# Patient Record
Sex: FEMALE | Race: WHITE | Employment: OTHER | URBAN - METROPOLITAN AREA
[De-identification: names, ages, dates, MRNs, and addresses within clinical notes are randomized per-mention and may not be internally consistent; named-entity substitution may affect disease eponyms.]

---

## 2022-08-03 ENCOUNTER — HOSPITAL ENCOUNTER (OUTPATIENT)
Dept: PHYSICAL THERAPY | Age: 77
Setting detail: THERAPIES SERIES
Discharge: HOME | End: 2022-08-03
Payer: MEDICARE

## 2022-08-03 PROCEDURE — 97162 PT EVAL MOD COMPLEX 30 MIN: CPT
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
## 2022-08-18 ENCOUNTER — HOSPITAL ENCOUNTER (OUTPATIENT)
Dept: PHYSICAL THERAPY | Age: 77
Setting detail: THERAPIES SERIES
Discharge: HOME | End: 2022-08-18
Payer: MEDICARE

## 2022-08-18 PROCEDURE — 97112 NEUROMUSCULAR REEDUCATION: CPT

## 2022-08-18 PROCEDURE — 97110 THERAPEUTIC EXERCISES: CPT

## 2022-08-18 NOTE — PROGRESS NOTES
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]  Unspecified abnormalities of gait and mobility [R26.9]  Personal history of transient ischemic attack (TIA), and cerebral infarction without residual deficits [Z86.73]  Muscle wasting and atrophy, not elsewhere classified, unspecified thigh [M62.559]    Treatment Diagnosis: Impaired mobility, LE weakness    Visit Information:  Insurance: Payor: Radha Moise / Plan: Christiano Garland / Product Type: *No Product type* /   PT Visit Information  Total # of Visits Approved:  (Eval only)  Total # of Visits to Date: 1  No Show: 0  Progress Note Due Date: 22  Canceled Appointment: 0  Progress Note Counter:  (22 to 22)    Subjective Information:  Subjective: States last fall was , when she fell in yard and broke 3rd finger on Rt hand. Pt reports blood sugar 66 prior to leaving for therapist. Eating candy at current. Denies further need for sugar.   HEP Compliance:  [x] Good [] Fair [] Poor [] Reports not doing due to:    Pain Screening  Patient Currently in Pain: Denies    Treatment:  Exercises:  Exercises  Exercise 1: Static standing: FA x60 sec, FT x60 sec, semitandem 30 sec x 2 b/l  Exercise 3: Single stepping over str cane F/L x10 EA  Exercise 4: Gait drills: F/L/R/Marching x2 laps ea with 0-2 UE support in // bars  Exercise 5: BUE wall slides at number wall with \"4\" and \"5'' as targets 5 sec x 10  Exercise 6: 2 way SLR x10  Exercise 7: Bridges 5 sec x 10  Exercise 8: Clams x10  Exercise 20: HEP: bridge, 2 way SLR, clam     *Indicates exercise, modality, or manual techniques to be initiated when appropriate    Objective Measures:   Ambulation  Surface: carpet  Device: No Device  Assistance: Supervision, Independent  Quality of Gait: Step to gait pattern with decreased LT step length, FWD flexed posture with upper body lateral shift to Lt  Distance: clinical distances    Assessment: Body Structures, Functions, Activity Limitations Requiring Skilled Therapeutic Intervention: Decreased functional mobility , Decreased ROM, Decreased strength, Decreased endurance, Decreased balance, Decreased posture  Assessment: Initiated tx per PT POC with good tolerance. Focused on therex to improve core and LE strength and posture to improve tolerance to functional WB activities. VC with WB activities to decrease fwd flexed posture with fair carryover initially though unable to maintain. Treatment Diagnosis: Impaired mobility, LE weakness  Therapy Prognosis: Good         Post-Pain Assessment:       Pain Rating (0-10 pain scale):   0/10   Location and pain description same as pre-treatment unless indicated. Action: [] NA   [x] Perform HEP  [] Meds as prescribed  [] Modalities as prescribed   [] Call Physician     GOALS   Patient Goal(s):      Short Term Goals Completed by 3 weeks Goal Status   STG 1 Independent with HEP In progress   STG 2 Pt will be able to complete sit to stand transfers without UE support S/I. In progress       Long Term Goals Completed by 6 weeks Goal Status   LTG 1 Improve jake LE strength to >/= 4+/5 to improve stability with standing and walking. In progress   LTG 2 Pt will ambulate without an AD >/= 200' on even and uneven surfaces with improved foot clearance and stride length S/I. In progress   LTG 3 Masters >/= 45/56 to reduce pt's risk for falls. In progress   LTG 4 5xSTS from chair with decreased reliance on UEs </= 15 sec to improve pt's functional strength.  In progress            Plan:  Frequency/Duration:  Plan  Plan Frequency: 2  Plan weeks: 6  Current Treatment Recommendations: Strengthening, ROM, Balance training, Functional mobility training, Transfer training, Gait training, Stair training, Neuromuscular re-education, Manual Therapy - Soft Tissue Mobilization, Home exercise program, Safety education & training, Patient/Caregiver education & training, Equipment evaluation, education, & procurement, Modalities  Pt to continue current HEP. See objective section for any therapeutic exercise changes, additions or modifications this date.     Therapy Time:      PT Individual Minutes  Time In: 8458  Time Out: 1576  Minutes: 56  Timed Code Treatment Minutes: 56 Minutes  Procedure Minutes: 0    Timed Activity Minutes Units   Ther Ex 31 2   Neuro 25 2     Electronically signed by Kade Rodney PTA on 8/18/22 at 4:40 PM EDT

## 2022-08-19 ENCOUNTER — HOSPITAL ENCOUNTER (OUTPATIENT)
Dept: PHYSICAL THERAPY | Age: 77
Setting detail: THERAPIES SERIES
Discharge: HOME | End: 2022-08-19
Payer: MEDICARE

## 2022-08-19 PROCEDURE — 97112 NEUROMUSCULAR REEDUCATION: CPT

## 2022-08-19 PROCEDURE — 97110 THERAPEUTIC EXERCISES: CPT

## 2022-08-19 NOTE — PROGRESS NOTES
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]  Unspecified abnormalities of gait and mobility [R26.9]  Personal history of transient ischemic attack (TIA), and cerebral infarction without residual deficits [Z86.73]  Muscle wasting and atrophy, not elsewhere classified, unspecified thigh [M62.559]    Treatment Diagnosis: Impaired mobility, LE weakness    Visit Information:  Insurance: Payor: Ratna Branch / Plan: Prabhjot Cotter / Product Type: *No Product type* /   PT Visit Information  Total # of Visits Approved: 14  Total # of Visits to Date: 2  No Show: 0  Progress Note Due Date: 22  Canceled Appointment: 0  Progress Note Counter:  (22 to 22)    Subjective Information:  Subjective: Pt with no new reports. Feels balance is slightly improving.   HEP Compliance:  [x] Good [] Fair [] Poor [] Reports not doing due to:    Pain Screening  Patient Currently in Pain: No    Treatment:  Exercises:  Exercises  Exercise 1: Static standing: semitandem, FA with head turns  Exercise 2: Foam: FA, wt shifts with fingertip support  Exercise 4: Gait drills: F/L/R/Marching x2 laps ea with 0-2 UE support in // bars  Exercise 6: 2 way SLR x10 jake  Exercise 7: Bridges 5 sec x 10  Exercise 8: Clams/ rev clams x10ea jake  Exercise 9: Gait ladder: F/L x2-3 laps ea focusing on increased step length  Exercise 10: STS from chair with 1 UE focusing on technique x5     Objective Measures:   Ambulation  Surface: carpet  Device: No Device  Assistance: Supervision, Independent  Quality of Gait: Vc's to increase jake step length, minimal jake DF, good Lt arm swing but minimal Rt arm swing  Gait Deviations: Slow Valeria, Decreased step length, Decreased step height, Decreased arm swing, Decreased head and trunk rotation  Distance: 200'    Assessment: Body Structures, Functions, Activity Limitations Requiring Skilled Therapeutic Intervention: Decreased functional mobility , Decreased ROM, Decreased strength, Decreased endurance, Decreased balance, Decreased posture  Assessment: Pt requires frequent vc's to increase step length when ambulating through clinic. Pt able to correct but is unable to maintain longer stride length. Initiated gait ladder to encourage increased step length. Requires vc's throughout to correct posture but demonstrates increased flexion as she fatigues. Treatment Diagnosis: Impaired mobility, LE weakness  Therapy Prognosis: Good     Activity Tolerance  Activity Tolerance: Patient tolerated treatment well    Post-Pain Assessment:       Pain Rating (0-10 pain scale):  0 /10   Location and pain description same as pre-treatment unless indicated. Action: [x] NA   [] Perform HEP  [] Meds as prescribed  [] Modalities as prescribed   [] Call Physician     GOALS     Short Term Goals Completed by 3 weeks Goal Status   STG 1 Independent with HEP In progress   STG 2 Pt will be able to complete sit to stand transfers without UE support S/I. In progress     Long Term Goals Completed by 6 weeks Goal Status   LTG 1 Improve jake LE strength to >/= 4+/5 to improve stability with standing and walking. In progress   LTG 2 Pt will ambulate without an AD >/= 200' on even and uneven surfaces with improved foot clearance and stride length S/I. In progress   LTG 3 Masters >/= 45/56 to reduce pt's risk for falls. In progress   LTG 4 5xSTS from chair with decreased reliance on UEs </= 15 sec to improve pt's functional strength.  In progress       Plan:  Frequency/Duration:  Plan  Plan Frequency: 2  Plan weeks: 6  Current Treatment Recommendations: Strengthening, ROM, Balance training, Functional mobility training, Transfer training, Gait training, Stair training, Neuromuscular re-education, Manual Therapy - Soft Tissue Mobilization, Home exercise program, Safety education & training, Patient/Caregiver education & training, Equipment evaluation, education, & procurement, Modalities  Pt to continue current HEP. See objective section for any therapeutic exercise changes, additions or modifications this date.     Therapy Time:      PT Individual Minutes  Time In: 1301  Time Out: 1355  Minutes: 54  Timed Code Treatment Minutes: 53 Minutes  Procedure Minutes:0  Timed Activity Minutes Units   Ther Ex 15 1   Gait 5 0   Neuro varghese 33 3     Electronically signed by Prakash Swift, PT on 8/19/22 at 1:04 PM EDT

## 2022-08-24 ENCOUNTER — HOSPITAL ENCOUNTER (OUTPATIENT)
Dept: PHYSICAL THERAPY | Age: 77
Setting detail: THERAPIES SERIES
Discharge: HOME | End: 2022-08-24
Payer: MEDICARE

## 2022-08-24 ENCOUNTER — APPOINTMENT (OUTPATIENT)
Dept: PHYSICAL THERAPY | Age: 77
End: 2022-08-24
Payer: MEDICARE

## 2022-08-24 NOTE — PROGRESS NOTES
[address]       Physical Therapy  Cancellation/No-show Note  Patient Name:  Brianna Blandon  :  1945   Date:  2022          Visit Information:  PT Visit Information  Total # of Visits Approved: 14  Total # of Visits to Date: 2  No Show: 0  Progress Note Due Date: 22  Canceled Appointment: 1  Progress Note Counter:  (22 to 22) Cx 22    For today's appointment patient:  [x]  Cancelled  []  Rescheduled appointment  []  No-show   []  Called pt to remind of next appointment     Reason given by patient:  []  Patient ill  []  Conflicting appointment  []  No transportation    []  Conflict with work  []  No reason given  [x]  Other:  PT appointments are too close together, fatigue     Comments:       Signature: Electronically signed by Angel Jose PTA on 22 at 11:08 AM EDT

## 2022-08-25 ENCOUNTER — HOSPITAL ENCOUNTER (OUTPATIENT)
Dept: PHYSICAL THERAPY | Age: 77
Setting detail: THERAPIES SERIES
Discharge: HOME | End: 2022-08-25
Payer: MEDICARE

## 2022-08-25 PROCEDURE — 97112 NEUROMUSCULAR REEDUCATION: CPT

## 2022-08-25 PROCEDURE — 97116 GAIT TRAINING THERAPY: CPT

## 2022-08-25 PROCEDURE — 97110 THERAPEUTIC EXERCISES: CPT

## 2022-08-25 NOTE — PROGRESS NOTES
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]  Unspecified abnormalities of gait and mobility [R26.9]  Personal history of transient ischemic attack (TIA), and cerebral infarction without residual deficits [Z86.73]  Muscle wasting and atrophy, not elsewhere classified, unspecified thigh [M62.559]    Treatment Diagnosis: Impaired mobility, LE weakness    Visit Information:  Insurance: Payor: Bernardo Frey / Plan: Marino Tesfaye / Product Type: *No Product type* /   PT Visit Information  Total # of Visits Approved: 14  Total # of Visits to Date: 3  No Show: 0  Progress Note Due Date: 22  Canceled Appointment: 1  Progress Note Counter: 3/14 (22 to 22)    Subjective Information:  Subjective: Pt with no new reports. compliant with HEP. Pt states she went to the Neurologist today and he doesn't need to see her again.   HEP Compliance:  [x] Good [] Fair [] Poor [] Reports not doing due to:    Pain Screening  Patient Currently in Pain: Denies    Treatment:  Exercises:  Exercises  Exercise 1: Static standing: semitandem Lt 20s/rt 30s, FT EO/EC x 1'  Exercise 2: Foam: FA, wt shifts/ LOS/head turns without support, occpost LOB SBA<> CGA  Exercise 3: Single stepping over str cane F/L x10 EA, 2 uesupp F, 0 ue supp Lat  Exercise 4: Gait drills: F/L/R/Marching/ Tandem x2 laps ea with 0-2 UE support in // bars  Exercise 6: 2 way SLR x10 jake  Exercise 7: Bridges 5 sec x 10  Exercise 8: Clams/ rev clams x10ea jake  Exercise 10: 5 x STS from chair with jake ue's  Exercise 11: SLS on 4\" box supported with shoulder flex/ext x 10 ea, Rt> Lt  Exercise 12: 6\" Hurdles F/L X 4, 2 ue SUPPORT     *Indicates exercise, modality, or manual techniques to be initiated when appropriate    Objective Measures:      Ambulation  Surface: carpet  Device: No Device  Assistance: Supervision, Independent  Quality of Gait: Vc's to increase shalmo step length, minimal shalom DF, good Shalom arm swing , Intermittent  Gait Deviations: Slow Valeria, Decreased step length, Decreased step height, Decreased arm swing, Decreased head and trunk rotation  Distance: 240'        Assessment: Body Structures, Functions, Activity Limitations Requiring Skilled Therapeutic Intervention: Decreased functional mobility , Decreased ROM, Decreased strength, Decreased endurance, Decreased balance, Decreased posture  Assessment: Pt demo's improved static stance with NBOS with RLE vs LLE. Pt continues decreased stride length and foot clearance w/o VCs. Pt demo'd improved STS speed this date and slightl Posterior LOB while on foam requiring CGA. Pt states increased SALCIDO after gait of 240ft  Treatment Diagnosis: Impaired mobility, LE weakness  Therapy Prognosis: Good          Post-Pain Assessment:       Pain Rating (0-10 pain scale):   0/10   Location and pain description same as pre-treatment unless indicated. Action: [x] NA   [] Perform HEP  [] Meds as prescribed  [] Modalities as prescribed   [] Call Physician     GOALS   Patient Goal(s):      Short Term Goals Completed by 3 weeks Goal Status   STG 1 Independent with HEP In progress   STG 2 Pt will be able to complete sit to stand transfers without UE support S/I. In progress     Long Term Goals Completed by 6 weeks Goal Status   LTG 1 Improve shalom LE strength to >/= 4+/5 to improve stability with standing and walking. In progress   LTG 2 Pt will ambulate without an AD >/= 200' on even and uneven surfaces with improved foot clearance and stride length S/I. In progress   LTG 3 Masters >/= 45/56 to reduce pt's risk for falls.  In progress   LTG 4 5xSTS from chair with decreased reliance on UEs </= 15 sec to improve pt's functional strength. In progress       Plan:  Frequency/Duration:  Plan  Plan Frequency: 2  Plan weeks: 6  Current Treatment Recommendations: Strengthening, ROM, Balance training, Functional mobility training, Transfer training, Gait training, Stair training, Neuromuscular re-education, Manual Therapy - Soft Tissue Mobilization, Home exercise program, Safety education & training, Patient/Caregiver education & training, Equipment evaluation, education, & procurement, Modalities  Pt to continue current HEP. See objective section for any therapeutic exercise changes, additions or modifications this date.     Therapy Time:      PT Individual Minutes  Time In: 9282  Time Out: 8310  Minutes: 60  Timed Code Treatment Minutes: 60 Minutes  Procedure Minutes:0  Timed Activity Minutes Units   Ther Ex 20 1   Gait 10 1   Neuro re ed 30 2     Electronically signed by Makayla Bolaños PTA on 8/25/22 at [address] [place] [address] EDT

## 2022-08-30 ENCOUNTER — HOSPITAL ENCOUNTER (OUTPATIENT)
Dept: PHYSICAL THERAPY | Age: 77
Setting detail: THERAPIES SERIES
Discharge: HOME | End: 2022-08-30
Payer: MEDICARE

## 2022-08-30 PROCEDURE — 97112 NEUROMUSCULAR REEDUCATION: CPT

## 2022-08-30 PROCEDURE — 97110 THERAPEUTIC EXERCISES: CPT

## 2022-08-30 ASSESSMENT — PAIN SCALES - GENERAL: PAINLEVEL_OUTOF10: 5

## 2022-08-30 ASSESSMENT — PAIN DESCRIPTION - LOCATION: LOCATION: HIP

## 2022-08-30 NOTE — PROGRESS NOTES
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]  Unspecified abnormalities of gait and mobility [R26.9]  Personal history of transient ischemic attack (TIA), and cerebral infarction without residual deficits [Z86.73]  Muscle wasting and atrophy, not elsewhere classified, unspecified thigh [M62.559]    Treatment Diagnosis: Impaired mobility, LE weakness    Visit Information:  Insurance: Payor: Oscar Jeong / Plan: Vanessa Elliott / Product Type: *No Product type* /   PT Visit Information  Total # of Visits Approved: 14  Total # of Visits to Date: 4  No Show: 0  Progress Note Due Date: 22  Canceled Appointment: 1  Progress Note Counter:  (22 to 22)    Subjective Information:  Subjective: Pt states she is experiencing increased pain in the right hip, she thinks from over working. States she feels her balance is improving.   HEP Compliance:  [x] Good [] Fair [] Poor [] Reports not doing due to:    Pain Screening  Patient Currently in Pain: Yes  Pain Assessment: 0-10  Pain Level: 5  Pain Location: Hip    Treatment:  Exercises:  Exercises  Exercise 1: Static standing: semitandem Lt/Rt 30s, EO/EC 30s  Exercise 2: Foam: static standing, tandem stance, head turns without support x20 sec  Exercise 4: Gait drills: L/R, Marching, Retro 2 laps ea with 2 UE support in // bars  Exercise 6: 2 way SLR x15 jake  Exercise 7: Bridges 5 sec x 10  Exercise 10: 5 x STS from chair with jake ue's 30 seconds   Exercise 12: 6\" Hurdles F/L X 4, single UE support  Exercise 13: Supine hip flexor stretch R x2min  Exercise 14: stairs navigation x4 unilateral UE

## 2022-09-02 ENCOUNTER — HOSPITAL ENCOUNTER (OUTPATIENT)
Dept: PHYSICAL THERAPY | Age: 77
Setting detail: THERAPIES SERIES
Discharge: HOME | End: 2022-09-02
Payer: MEDICARE

## 2022-09-02 PROCEDURE — 97110 THERAPEUTIC EXERCISES: CPT

## 2022-09-02 PROCEDURE — 97116 GAIT TRAINING THERAPY: CPT

## 2022-09-02 PROCEDURE — 97112 NEUROMUSCULAR REEDUCATION: CPT

## 2022-09-02 NOTE — PROGRESS NOTES
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]  Unspecified abnormalities of gait and mobility [R26.9]  Personal history of transient ischemic attack (TIA), and cerebral infarction without residual deficits [Z86.73]  Muscle wasting and atrophy, not elsewhere classified, unspecified thigh [M62.559]    Treatment Diagnosis: Impaired mobility, LE weakness    Visit Information:  Insurance: Payor: Radha Moise / Plan: Christiano Garland / Product Type: *No Product type* /   PT Visit Information  Total # of Visits Approved: 14  Total # of Visits to Date: 5  No Show: 0  Progress Note Due Date: 22  Canceled Appointment: 1  Progress Note Counter:  (22 to 22)    Subjective Information:  Subjective: Pt states she is not in pain today. Pt states she felt a little fatigued after last treatment session but wasn't in pain.   HEP Compliance:  [x] Good [] Fair [] Poor [] Reports not doing due to:    Pain Screening  Patient Currently in Pain: No    Treatment:  Exercises:  Exercises  Exercise 4: Gait drills: F/L/R/Marching/ Tandem x2 laps ea with 0-2 UE support in // bars  Exercise 6: 2 way SLR x12 jake  Exercise 7: Bridges 5 sec x 12  Exercise 8: Clams/ rev clams x12 ea jake  Exercise 10: 6 x STS from chair with unilateral UE support, focusing on controlling descent back to chair, required VC's for control      *Indicates exercise, modality, or manual techniques to be initiated when appropriate    Objective Measures:   Ambulation  Surface: carpet  Device: No Device  Assistance: Supervision, Independent  Quality of Gait: increased jake step length, good BL arm swing  Gait Deviations: Slow Valeria, Decreased step height  Distance: 345  Comments: able to complete increased distance, still requires VC's for increased step length, good carryover. Pt able to complete head/trunk rotation during ambulation with minimal lateral sway. Strength: [] NT  [x] MMT completed:  Strength RLE  R Hip Flexion: 5/5  R Hip ABduction: 4/5  R Knee Flexion: 5/5  R Knee Extension: 5/5  R Ankle Dorsiflexion: 5/5  Strength LLE  L Hip Flexion: 5/5  L Hip ABduction: 4+/5  L Knee Flexion: 5/5  L Knee Extension: 4+/5  L Ankle Dorsiflexion: 5/5    Assessment: Body Structures, Functions, Activity Limitations Requiring Skilled Therapeutic Intervention: Decreased functional mobility , Decreased ROM, Decreased strength, Decreased endurance, Decreased balance, Decreased posture  Assessment: Pt demonstrates ability to ambulate more than 200' without AD and minimal lateral sway. During ambulation patient demonstrated increase in step length and ability to maintain balance during head/trunk rotation. No LOB noted during ambulation. Patient demonstrated increase in Masters Balance score by 6 points and a decrease in time required to complete 5 sit to stands by 1 second. Patient continues to increase and maintain strength in bilateral lower extremities, still demonstrating deficit in bilateral hip abduction, and left knee extension. Patient will continue to benefit from skilled physical therapy to achieve goals in plan of care. Treatment Diagnosis: Impaired mobility, LE weakness  Therapy Prognosis: Good          Post-Pain Assessment:       Pain Rating (0-10 pain scale):   2/10   Location and pain description same as pre-treatment unless indicated.    Action: [] NA   [x] Perform HEP  [] Meds as prescribed  [] Modalities as prescribed   [] Call Physician     GOALS   Patient Goal(s):      Short Term Goals Completed by 3 weeks Goal Status   STG 1 Independent with HEP In progress   STG 2 Pt will be able to complete sit to stand transfers without UE support S/I. In progress       Long Term Goals Completed by 6 weeks Goal Status   LTG 1 Improve jake LE strength to >/= 4+/5 to improve stability with standing and walking. In progress   LTG 2 Pt will ambulate without an AD >/= 200' on even and uneven surfaces with improved foot clearance and stride length S/I. In progress   LTG 3 Masters >/= 45/56 to reduce pt's risk for falls. In progress   LTG 4 5xSTS from chair with decreased reliance on UEs </= 15 sec to improve pt's functional strength. In progress       Plan:  Frequency/Duration:  Plan  Plan Frequency: 2  Plan weeks: 6  Current Treatment Recommendations: Strengthening, ROM, Balance training, Functional mobility training, Transfer training, Gait training, Stair training, Neuromuscular re-education, Manual Therapy - Soft Tissue Mobilization, Home exercise program, Safety education & training, Patient/Caregiver education & training, Equipment evaluation, education, & procurement, Modalities  Pt to continue current HEP. See objective section for any therapeutic exercise changes, additions or modifications this date.     Therapy Time:   PT Individual Minutes  Time In: 1300  Time Out: 1400  Minutes: 60  Timed Code Treatment Minutes: 60 Minutes    Procedure Minutes:0  Timed Activity Minutes Units   Ther Ex 17 1   Neuro  23 2   Gait 20 1     Electronically signed by Jammie Serra PTA on 9/2/22 at 2:22 PM EDT

## 2022-09-02 NOTE — PROGRESS NOTES
[facility], [address]     Ph: [phone]  Fax: [phone]      [] Certification  [] Recertification []  Plan of Care  [x] Progress Note [] Discharge      Referring Provider: Blas Banks MD ([place])      From:  Kelly Corona, PT, DPT   Patient: Sarah Chavez (44 y.o. 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]  Unspecified abnormalities of gait and mobility [R26.9]  Personal history of transient ischemic attack (TIA), and cerebral infarction without residual deficits [Z86.73]  Muscle wasting and atrophy, not elsewhere classified, unspecified thigh [M62.559]    Treatment Diagnosis: Impaired mobility, LE weakness    Plan of Care/Certification Expiration Date: : 22  Progress Report Period from:  2022  to 2022    Visits to Date: 5 No Show: 0 Cancelled Appts: 1    OBJECTIVE:   Short Term Goals - Time Frame for Short term goals: 3 weeks    Goals Current/Discharge status  Status   Short term goal 1: Independent with HEP  Complaint with HEP  Partially met   Short term goal 2: Pt will be able to complete sit to stand transfers without UE support S/I. Pt still demonstrates difficulty performing sit to stand transfers with one UE support. Not Met, In progress     Long Term Goals - Time Frame for Long term goals : 6 weeks  Goals Current/ Discharge status Status   Long term goal 1: Improve jake LE strength to >/= 4+/5 to improve stability with standing and walking.  Strength LLE  L Hip Flexion: 5/5  L Hip ABduction: 4+/5  L Knee Flexion: 5/5  L Knee Extension: 4+/5  L Ankle Dorsiflexion: 5/5  Strength RLE  R Hip Flexion: 5/5  R Hip ABduction: 4/5  R Knee Flexion: 5/5  R Knee Extension: 5/5  R Ankle Dorsiflexion: 5/5      Not Met, In progress   Long term goal 2: Pt will ambulate without an AD >/= 200' on even and uneven surfaces with improved foot clearance and stride length S/I. Ambulation  Surface: carpet  Device: No Device  Assistance: Supervision, Independent  Quality of Gait: increased jake step length, good BL arm swing  Gait Deviations: Slow Valeria, Decreased step height  Distance: 345  Comments: able to complete increased distance, still requires VC's for increased step length, good carryover. Pt able to complete head/trunk rotation during ambulation with minimal lateral sway. Not Met, In progress   Long term goal 3: Masters >/= 45/56 to reduce pt's risk for falls. Masters Balance Score: 43  Not Met, In progress   Long term goal 4: 5xSTS from chair with decreased reliance on UEs </= 15 sec to improve pt's functional strength. 5 Times Sit to Stand: 21.54 seconds  Not Met, In progress       Body Structures, Functions, Activity Limitations Requiring Skilled Therapeutic Intervention: Decreased functional mobility , Decreased ROM, Decreased strength, Decreased endurance, Decreased balance, Decreased posture  Assessment: Pt demonstrates ability to ambulate more than 200' without AD and minimal lateral sway. During ambulation patient demonstrated increase in step length and ability to maintain balance during head/trunk rotation. No LOB noted during ambulation. Still demonstrates forward flexed posture during ambulation, but doesn't impair ability to ambulate. Pt demonstrates shuffling like gait but is able to correct with verbal cueing to increase stride length. Patient demonstrated increase in Masters Balance score by 6 points and a decrease in time required to complete 5 sit to stands by 1 second suggesting improving balance and functional strength despite not meeting goals. Patient continues to increase and maintain strength in bilateral lower extremities, still demonstrating deficit in bilateral hip abduction, and left knee extension.  Patient will continue to benefit from skilled physical therapy to achieve goals in plan of care. Therapy Prognosis: Good    PLAN: [] Evaluate and Treat  Frequency/Duration:  Plan Frequency: 2  Plan weeks: 6  Current Treatment Recommendations: Strengthening, ROM, Balance training, Functional mobility training, Transfer training, Gait training, Stair training, Neuromuscular re-education, Manual Therapy - Soft Tissue Mobilization, Home exercise program, Safety education & training, Patient/Caregiver education & training, Equipment evaluation, education, & procurement, Modalities                  Patient Status:[x] Continue/ Initiate plan of Care    [] Discharge PT. Recommend pt continue with HEP. [] Additional visits requested, Please re-certify for additional visits:    [] Hold         Signature: Objective information by Electronically signed by Joana Chauhan PTA on 9/2/22 at 1:48 PM EDT  Electronically signed by Jordana Huizar PT on 9/7/2022 at 1:13 PM      If you have any questions or concerns, please don't hesitate to call. Thank you for your referral.    I have reviewed this plan of care and certify a need for medically necessary rehabilitation services.     Physician Signature:__________________________________________________________  Date:  Please sign and return

## 2022-09-07 ENCOUNTER — HOSPITAL ENCOUNTER (OUTPATIENT)
Dept: PHYSICAL THERAPY | Age: 77
Setting detail: THERAPIES SERIES
Discharge: HOME | End: 2022-09-07
Payer: MEDICARE

## 2022-09-07 PROCEDURE — 97112 NEUROMUSCULAR REEDUCATION: CPT

## 2022-09-07 PROCEDURE — 97110 THERAPEUTIC EXERCISES: CPT

## 2022-09-07 ASSESSMENT — PAIN SCALES - GENERAL: PAINLEVEL_OUTOF10: 0

## 2022-09-07 NOTE — PROGRESS NOTES
able to complete sit to stand transfers without UE support S/I. Not Met       Long Term Goals Completed by 6 weeks Goal Status   LTG 1 Improve jake LE strength to >/= 4+/5 to improve stability with standing and walking. Not Met   LTG 2 Pt will ambulate without an AD >/= 200' on even and uneven surfaces with improved foot clearance and stride length S/I. Not Met   LTG 3 Masters >/= 45/56 to reduce pt's risk for falls. Not Met   LTG 4 5xSTS from chair with decreased reliance on UEs </= 15 sec to improve pt's functional strength. Not Met       Plan:  Frequency/Duration:  Plan  Plan Frequency: 2  Plan weeks: 6  Current Treatment Recommendations: Strengthening, ROM, Balance training, Functional mobility training, Transfer training, Gait training, Stair training, Neuromuscular re-education, Manual Therapy - Soft Tissue Mobilization, Home exercise program, Safety education & training, Patient/Caregiver education & training, Equipment evaluation, education, & procurement, Modalities  Pt to continue current HEP. See objective section for any therapeutic exercise changes, additions or modifications this date.     Therapy Time:      PT Individual Minutes  Time In: 1300  Time Out: 1400  Minutes: 60  Timed Code Treatment Minutes: 60 Minutes    Procedure Minutes:0   Timed Activity Minutes Units   Ther Ex 30 2   Neuro 30 2     Electronically signed by Bartolome Victoria PTA on 9/7/22 at 2:08 PM EDT

## 2022-09-09 ENCOUNTER — HOSPITAL ENCOUNTER (OUTPATIENT)
Dept: PHYSICAL THERAPY | Age: 77
Setting detail: THERAPIES SERIES
Discharge: HOME | End: 2022-09-09
Payer: MEDICARE

## 2022-09-09 PROCEDURE — 97112 NEUROMUSCULAR REEDUCATION: CPT

## 2022-09-09 PROCEDURE — 97110 THERAPEUTIC EXERCISES: CPT

## 2022-09-09 PROCEDURE — 97535 SELF CARE MNGMENT TRAINING: CPT

## 2022-09-09 ASSESSMENT — PAIN DESCRIPTION - DESCRIPTORS: DESCRIPTORS: ACHING

## 2022-09-09 ASSESSMENT — PAIN DESCRIPTION - ORIENTATION: ORIENTATION: RIGHT

## 2022-09-09 ASSESSMENT — PAIN DESCRIPTION - LOCATION: LOCATION: HIP

## 2022-09-09 NOTE — PROGRESS NOTES
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]  Unspecified abnormalities of gait and mobility [R26.9]  Personal history of transient ischemic attack (TIA), and cerebral infarction without residual deficits [Z86.73]  Muscle wasting and atrophy, not elsewhere classified, unspecified thigh [M62.559]       Treatment Diagnosis: Impaired mobility, LE weakness    Visit Information:  Insurance: Payor: Marbella Valdes / Plan: Dianna Dumont / Product Type: *No Product type* /   PT Visit Information  PT Insurance Information: [facility] [employer]  Total # of Visits Approved: 14  Total # of Visits to Date: 6  Plan of Care/Certification Expiration Date: 22  No Show: 0  Progress Note Due Date: 10/02/22  Canceled Appointment: 1  Progress Note Counter:  (22 to 22)    Subjective Information:  Subjective: Pt states rt hip is still bothering her  HEP Compliance:  [x] Good [] Fair [] Poor [] Reports not doing due to:    Pain Screening  Patient Currently in Pain: Yes  Pain Assessment: 0-10  Pain Location: Hip  Pain Orientation: Right  Pain Descriptors: Aching    Treatment:  Exercises:  Exercises  Exercise 1: Posterior LOS, with stepping or hip strategies  Exercise 2: Foam: stepping on /off, head turns, LOS  Exercise 3: Single stepping over 6\" kari with reach x 10 LAT, unilateral supp  Exercise 4: Gait drills: /Marching/ Tandem( F/R) x3 laps ea with 0-2 UE support in // bars  Exercise 7: Bridges with YTB 5\"x 12, March with YTB x 20  Exercise 8: H/L Clamshells with YTB  x20 jake  Exercise 10: STS from chair with unilateral UE support, focusing on controlling descent back to chair, required VC's for control  Exercise 12: 6\" Hurdles F/L x 3 single/bilateral UE support  Exercise 14: rows/lats with RTB x 10 ea, rolling ball up wall  Exercise 15: 6' box step ups x10 ea unilateral  Exercise 20: HEP:, sit to stands, marches with YTB, rows/lats       *Indicates exercise, modality, or manual techniques to be initiated when appropriate  Assessment: Body Structures, Functions, Activity Limitations Requiring Skilled Therapeutic Intervention: Decreased functional mobility , Decreased ROM, Decreased strength, Decreased endurance, Decreased balance, Decreased posture  Assessment: Progressed several ex's for strengthening Hips , STS, and added posture ex's . Pt demp's increased time to complete STS transfers witth cueing for ant tilt as pt tends to be retropulsive, multiple attempts to improve technique with fair carryover. Pt demo's decreased compensatory stategies to correct posterior LOS, will progress NV. Treatment Diagnosis: Impaired mobility, LE weakness  Therapy Prognosis: Good          Post-Pain Assessment:       Pain Rating (0-10 pain scale):  5 /10   Location and pain description same as pre-treatment unless indicated. Action: [] NA   [] Perform HEP  [x] Meds as prescribed  [x] Modalities as prescribed   [] Call Physician     GOALS   Patient Goal(s):      Short Term Goals Completed by 3 weeks Goal Status   STG 1 Independent with HEP Partially met, In progress   STG 2 Pt will be able to complete sit to stand transfers without UE support S/I. Not Met, In progress       Long Term Goals Completed by 6 weeks Goal Status   LTG 1 Improve jake LE strength to >/= 4+/5 to improve stability with standing and walking. Not Met, In progress   LTG 2 Pt will ambulate without an AD >/= 200' on even and uneven surfaces with improved foot clearance and stride length S/I. Not Met, In progress   LTG 3 Masters >/= 45/56 to reduce pt's risk for falls.  Not Met, In progress   LTG 4 5xSTS from chair with decreased reliance on UEs </= 15 sec to improve pt's functional strength. Not Met, In progress       Plan:  Frequency/Duration:  Plan  Plan Frequency: 2  Plan weeks: 6  Specific Instructions for Next Treatment: Post LOS with hip/stepping strategies  Current Treatment Recommendations: Strengthening, ROM, Balance training, Functional mobility training, Transfer training, Gait training, Stair training, Neuromuscular re-education, Manual Therapy - Soft Tissue Mobilization, Home exercise program, Safety education & training, Patient/Caregiver education & training, Equipment evaluation, education, & procurement, Modalities  Pt to continue current HEP. See objective section for any therapeutic exercise changes, additions or modifications this date.     Therapy Time:      PT Individual Minutes  Time In: 7071  Time Out: 9941  Minutes: 62  Timed Code Treatment Minutes: 60 Minutes  Procedure Minutes:0  Timed Activity Minutes Units   Transfers 15 1   Ther Ex 20 1   Neuro 25 2     Electronically signed by Aravind Amor PTA on 9/9/22 at 12:02 PM EDT

## 2022-09-23 ENCOUNTER — HOSPITAL ENCOUNTER (OUTPATIENT)
Dept: PHYSICAL THERAPY | Age: 77
Setting detail: THERAPIES SERIES
Discharge: HOME | End: 2022-09-23
Payer: MEDICARE

## 2022-09-23 NOTE — PROGRESS NOTES
Therapy                            Cancellation/No-show Note    Date: 2022  Patient: Rozina Carlisle (87 y.o. 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]  Unspecified abnormalities of gait and mobility [R26.9]  Personal history of transient ischemic attack (TIA), and cerebral infarction without residual deficits [Z86.73]  Muscle wasting and atrophy, not elsewhere classified, unspecified thigh [M62.559]      Visit Information:  Insurance: Payor: Ericka Number / Plan: Christel Chou / Product Type: *No Product type* /   Visits to Date: 6   No Show/Cancelled Appts: 0 / 2      For today's appointment patient:  [x]  Cancelled  []  Rescheduled appointment  []  No-show   []  Called pt to remind of next appointment     Reason given by patient:  [x]  Patient ill  []  Conflicting appointment  []  No transportation    []  Conflict with work  []  No reason given  [x]  Other:  Family has Covid    [x] Pt has future appointments scheduled, no follow up needed  [] Pt requests to be on hold.     Reason:   If > 2 weeks please discuss with therapist.  [] Therapist to call pt for follow up     Comments:       Signature: Electronically signed by Patty Gleason PTA on 22 at 9:34 AM EDT

## 2022-09-28 ENCOUNTER — HOSPITAL ENCOUNTER (OUTPATIENT)
Dept: PHYSICAL THERAPY | Age: 77
Setting detail: THERAPIES SERIES
Discharge: HOME | End: 2022-09-28
Payer: MEDICARE

## 2022-09-28 PROCEDURE — 97112 NEUROMUSCULAR REEDUCATION: CPT

## 2022-09-28 PROCEDURE — 97110 THERAPEUTIC EXERCISES: CPT

## 2022-09-28 ASSESSMENT — PAIN DESCRIPTION - ORIENTATION: ORIENTATION: RIGHT

## 2022-09-28 ASSESSMENT — PAIN SCALES - GENERAL: PAINLEVEL_OUTOF10: 8

## 2022-09-28 ASSESSMENT — PAIN DESCRIPTION - LOCATION: LOCATION: HIP

## 2022-09-28 NOTE — PROGRESS NOTES
Device  Assistance: Supervision, Independent  Quality of Gait: Decreased Rt step length - vc's to increase, able to increase and demos increased speed, Rt Trendelenburg with a FWD flexed posture  Gait Deviations: Decreased step height, Decreased arm swing  Distance: 200'      Strength: [] NT  [x] MMT completed:  Strength RLE  R Hip Flexion: 5/5  R Hip Extension: 2+/5  R Hip ABduction: 4/5  R Knee Flexion: 5/5  R Knee Extension: 5/5  R Ankle Dorsiflexion: 5/5  Strength LLE  L Hip Flexion: 5/5  L Hip Extension: 2/5  L Hip ABduction: 4+/5  L Knee Flexion: 5/5  L Knee Extension: 5/5  L Ankle Dorsiflexion: 5/5      Assessment: Body Structures, Functions, Activity Limitations Requiring Skilled Therapeutic Intervention: Decreased functional mobility , Decreased ROM, Decreased strength, Decreased endurance, Decreased balance, Decreased posture  Assessment: Pt with ongoing jake hip weakness per MMT and as seen by deviations with standing and walking. Rt hip abductor weakness and difficulty activating adbuctors during Rt stance phase likely causing increased pain when ambulating. Pt with a small improvement in Masters score. PT requires increased time to complete 5xSTS due to posterior LOB with attempting STS. Pt would benefit from further skilled PT to improve her strength, balance and safety with all mobility while reducing Rt hip pain. Treatment Diagnosis: Impaired mobility, LE weakness  Therapy Prognosis: Good     Activity Tolerance  Activity Tolerance: Patient tolerated treatment well    Post-Pain Assessment:       Pain Rating (0-10 pain scale):  \"less\" /10   Location and pain description same as pre-treatment unless indicated.    Action: [] NA   [] Perform HEP  [x] Meds as prescribed  [x] Modalities as prescribed   [] Call Physician     GOALS     Short Term Goals Completed by 3 weeks Goal Status   STG 1 Independent with HEP Partially met, In progress   STG 2 Pt will be able to complete sit to stand transfers without UE support S/I. Not Met, In progress     Long Term Goals Completed by 6 weeks Goal Status   LTG 1 Improve jake LE strength to >/= 4+/5 to improve stability with standing and walking. Not Met, In progress   LTG 2 Pt will ambulate without an AD >/= 200' on even and uneven surfaces with improved foot clearance and stride length S/I. Not Met, In progress   LTG 3 Masters >/= 45/56 to reduce pt's risk for falls. Not Met, In progress   LTG 4 5xSTS from chair with decreased reliance on UEs </= 15 sec to improve pt's functional strength. Not Met, In progress            Plan:  Frequency/Duration:  Plan  Plan Frequency: 2  Plan weeks: 6  Specific Instructions for Next Treatment: Post LOS with hip/stepping strategies  Current Treatment Recommendations: Strengthening, ROM, Balance training, Functional mobility training, Transfer training, Gait training, Stair training, Neuromuscular re-education, Manual Therapy - Soft Tissue Mobilization, Home exercise program, Safety education & training, Patient/Caregiver education & training, Equipment evaluation, education, & procurement, Modalities  Pt to continue current HEP. See objective section for any therapeutic exercise changes, additions or modifications this date.     Therapy Time:      PT Individual Minutes  Time In: 3353  Time Out: 1159  Minutes: 54  Timed Code Treatment Minutes: 53 Minutes  Procedure Minutes:0  Timed Activity Minutes Units   Ther Ex 20 2   Neuro varghese 28 2   Gait 5 0     Electronically signed by Kei Roldan, PT on 9/28/22 at 12:49 PM EDT

## 2022-09-28 NOTE — PROGRESS NOTES
[address]     Ph: [phone]  Fax: [phone]      [] Certification  [] Recertification []  Plan of Care  [x] Progress Note [] Discharge      Referring Provider: Justin Hernandez MD (Zoanne Fret)     From:  Jaida Albert, PT  Patient: Dawne Aase (31 y.o. 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]  Unspecified abnormalities of gait and mobility [R26.9]  Personal history of transient ischemic attack (TIA), and cerebral infarction without residual deficits [Z86.73]  Muscle wasting and atrophy, not elsewhere classified, unspecified thigh [M62.559]       Treatment Diagnosis: Impaired mobility, LE weakness    Plan of Care/Certification Expiration Date: : 22   Progress Report Period from:  2022  to 2022    Visits to Date: 7 No Show: 0 Cancelled Appts: 2    OBJECTIVE:   Short Term Goals - Time Frame for Short term goals: 3 weeks    Goals Current/Discharge status  Status   Short term goal 1: Independent with HEP  Independent and ongoing Partially met, In progress   Short term goal 2: Pt will be able to complete sit to stand transfers without UE support S/I. Requires 1 UE A with decreased quality/technique Not Met, In progress     Long Term Goals - Time Frame for Long term goals : 6 weeks  Goals Current/ Discharge status Status   Long term goal 1: Improve jake LE strength to >/= 4+/5 to improve stability with standing and walking.  Strength LLE  L Hip Flexion: 5/5  L Hip Extension: 2/5  L Hip ABduction: 4+/5  L Knee Flexion: 5/5  L Knee Extension: 5/5  L Ankle Dorsiflexion: 5/5  Strength RLE  R Hip Flexion: 5/5  R Hip Extension: 2+/5  R Hip ABduction: 4/5  R Knee Flexion: 5/5  R Knee Extension: 5/5  R Ankle Dorsiflexion: 5/5  Not Met, In progress   Long term goal 2: Pt will ambulate without an AD >/= 200' on even and uneven surfaces with improved foot clearance and stride length S/I. Ambulation  Surface: carpet  Device: No Device  Assistance: Supervision, Independent  Quality of Gait: Decreased Rt step length - vc's to increase, able to increase and demos increased speed, Rt Trendelenburg with a FWD flexed posture  Gait Deviations: Decreased step height, Decreased arm swing  Distance: 200'   Not Met, In progress   Long term goal 3: Masters >/= 45/56 to reduce pt's risk for falls. Masters Balance Score: 44   Not Met, In progress   Long term goal 4: 5xSTS from chair with decreased reliance on UEs </= 15 sec to improve pt's functional strength. 5 Times Sit to Stand  5 TIMES SIT TO STAND: 44.8 seconds (from chair with 1 UE) Not Met, In progress       Body Structures, Functions, Activity Limitations Requiring Skilled Therapeutic Intervention: Decreased functional mobility , Decreased ROM, Decreased strength, Decreased endurance, Decreased balance, Decreased posture  Assessment: Pt with ongoing jake hip weakness per MMT and as seen by deviations with standing and walking. Rt hip abductor weakness and difficulty activating adbuctors during Rt stance phase likely causing increased pain when ambulating. Pt with a small improvement in Mastesr score. PT requires increased time to complete 5xSTS due to posterior LOB with attempting STS. Pt would benefit from further skilled PT to improve her strength, balance and safety with all mobility while reducing Rt hip pain.   Specific Instructions for Next Treatment: Post LOS with hip/stepping strategies  Therapy Prognosis: Good           PLAN: [x] Evaluate and Treat  Frequency/Duration:  Plan Frequency: 2  Plan weeks: 6  Specific Instructions for Next Treatment: Post LOS with hip/stepping strategies  Current Treatment Recommendations: Strengthening, ROM, Balance training, Functional mobility training, Transfer training, Gait training, Stair training, Neuromuscular re-education, Manual Therapy - Soft Tissue Mobilization, Home exercise program, Safety education & training, Patient/Caregiver education & training, Equipment evaluation, education, & procurement, Modalities     Precautions: falls                           Patient Status:[x] Continue/ Initiate plan of Care    [] Discharge PT. Recommend pt continue with HEP. [] Additional visits requested, Please re-certify for additional visits:    [] Hold         Signature: Electronically signed by Anahi Lu PT on 9/28/22 at 12:52 PM EDT      If you have any questions or concerns, please don't hesitate to call. Thank you for your referral.    I have reviewed this plan of care and certify a need for medically necessary rehabilitation services.     Physician Signature:__________________________________________________________  Date:  Please sign and return

## 2022-09-30 ENCOUNTER — HOSPITAL ENCOUNTER (OUTPATIENT)
Dept: PHYSICAL THERAPY | Age: 77
Setting detail: THERAPIES SERIES
Discharge: HOME | End: 2022-09-30
Payer: MEDICARE

## 2022-09-30 PROCEDURE — 97110 THERAPEUTIC EXERCISES: CPT

## 2022-09-30 PROCEDURE — 97112 NEUROMUSCULAR REEDUCATION: CPT

## 2022-09-30 ASSESSMENT — PAIN SCALES - GENERAL: PAINLEVEL_OUTOF10: 7

## 2022-09-30 ASSESSMENT — PAIN DESCRIPTION - LOCATION: LOCATION: HIP

## 2022-09-30 ASSESSMENT — PAIN DESCRIPTION - ORIENTATION: ORIENTATION: RIGHT

## 2022-09-30 NOTE — PROGRESS NOTES
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]  Unspecified abnormalities of gait and mobility [R26.9]  Personal history of transient ischemic attack (TIA), and cerebral infarction without residual deficits [Z86.73]  Muscle wasting and atrophy, not elsewhere classified, unspecified thigh [M62.559]       Treatment Diagnosis: Impaired mobility, LE weakness    Visit Information:  Insurance: Payor: Monae Nvooa / Plan: quietrevolutioner / Product Type: *No Product type* /   PT Visit Information  PT Insurance Information: BCBS Medicare  Total # of Visits Approved: 14  Total # of Visits to Date: 8  Plan of Care/Certification Expiration Date: 22  No Show: 0  Progress Note Due Date: 10/28/22  Canceled Appointment: 2  Progress Note Counter:  (22 to 22)    Subjective Information:  Subjective: Pt reports having pain when walking. Pt reports relief after new home exercises and completed them twice in one day.   HEP Compliance:  [x] Good [] Fair [] Poor [] Reports not doing due to:    Pain Screening  Patient Currently in Pain: Yes  Pain Assessment: 0-10  Pain Level: 7  Pain Location: Hip  Pain Orientation: Right    Treatment:  Exercises:  Exercises  Exercise 4: Gait drills: F/L/Marching/Retro x2  Exercise 6: 2 way SLR x12 jake- sidely ABD R unable to do due to pain  Exercise 8: H/L Clamshells with YTB  x20 jake  Exercise 9: Supine figure 4 str 30s x2 on Rt  Exercise 10: STS from mat with 1-2 UE support 2x5, VC's for foot placement and eccentric control  Exercise 11: Hip hikes x10 jake  Exercise 13: Supine hip flexor stretch R x2min  Exercise 15: 6' box step ups x10 ea unilateral  Exercise 17: standing in // bars: hip abd, exten, flexion x10  Exercise 20: HEP: semitandem, FT, figure 4 str, Modified Merrill str, hip hikes, prone lying    Assessment: Body Structures, Functions, Activity Limitations Requiring Skilled Therapeutic Intervention: Decreased functional mobility , Decreased ROM, Decreased strength, Decreased endurance, Decreased balance, Decreased posture  Assessment: Pt demonstrates with ongoing hip pain R>L during ambulating. Pt unable to perfom SLS on right lower extremity due to pain in the hip. Requiring multiple verbal and tactile cues for proper movement sequence and eccentric control. Pt demonstrates good recall on HEP and stretches. Reviewed hip flexor stretch with pt this visit to ensure she was doing it properly. Pt in understanding on how to perform. Pt will continue to benefit from skilled physical therapy to progress to goals per plan of care. Treatment Diagnosis: Impaired mobility, LE weakness  Therapy Prognosis: Good     Post-Pain Assessment:       Pain Rating (0-10 pain scale):  5 /10 -when ambulating   Location and pain description same as pre-treatment unless indicated. Action: [] NA   [x] Perform HEP  [] Meds as prescribed  [] Modalities as prescribed   [] Call Physician     GOALS   Patient Goal(s):      Short Term Goals Completed by 3 weeks Goal Status   STG 1 Independent with HEP Partially met, In progress   STG 2 Pt will be able to complete sit to stand transfers without UE support S/I. Not Met, In progress       Long Term Goals Completed by 6 weeks Goal Status   LTG 1 Improve jake LE strength to >/= 4+/5 to improve stability with standing and walking. Not Met, In progress   LTG 2 Pt will ambulate without an AD >/= 200' on even and uneven surfaces with improved foot clearance and stride length S/I. Not Met, In progress   LTG 3 Masters >/= 45/56 to reduce pt's risk for falls.  Not Met, In progress   LTG 4 5xSTS from chair with decreased reliance on UEs </= 15 sec to improve pt's functional strength. Not Met, In progress          Plan:  Frequency/Duration:  Plan  Plan Frequency: 2  Plan weeks: 6  Specific Instructions for Next Treatment: Post LOS with hip/stepping strategies  Current Treatment Recommendations: Strengthening, ROM, Balance training, Functional mobility training, Transfer training, Gait training, Stair training, Neuromuscular re-education, Manual Therapy - Soft Tissue Mobilization, Home exercise program, Safety education & training, Patient/Caregiver education & training, Equipment evaluation, education, & procurement, Modalities  Pt to continue current HEP. See objective section for any therapeutic exercise changes, additions or modifications this date.     Therapy Time:   PT Individual Minutes  Time In: 5670  Time Out: 5834  Minutes: 54  Timed Code Treatment Minutes: 54 Minutes    Procedure Minutes:0  Timed Activity Minutes Units   Ther Ex 20 1   Neuro 34 3     Electronically signed by Lisa Reed PTA on 9/30/22 at 11:52 AM EDT

## 2022-10-03 ENCOUNTER — HOSPITAL ENCOUNTER (OUTPATIENT)
Dept: PHYSICAL THERAPY | Age: 77
Setting detail: THERAPIES SERIES
Discharge: HOME | End: 2022-10-03
Payer: MEDICARE

## 2022-10-03 PROCEDURE — 97110 THERAPEUTIC EXERCISES: CPT

## 2022-10-03 PROCEDURE — 97112 NEUROMUSCULAR REEDUCATION: CPT

## 2022-10-03 ASSESSMENT — PAIN DESCRIPTION - LOCATION: LOCATION: LEG

## 2022-10-03 ASSESSMENT — PAIN SCALES - GENERAL: PAINLEVEL_OUTOF10: 8

## 2022-10-03 ASSESSMENT — PAIN DESCRIPTION - ORIENTATION: ORIENTATION: RIGHT

## 2022-10-03 NOTE — PROGRESS NOTES
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]  Unspecified abnormalities of gait and mobility [R26.9]  Personal history of transient ischemic attack (TIA), and cerebral infarction without residual deficits [Z86.73]  Muscle wasting and atrophy, not elsewhere classified, unspecified thigh [M62.559]       Treatment Diagnosis: Impaired mobility, LE weakness    Visit Information:  Insurance: Payor: Fleet Glatter / Plan: Esau [employer]TwinPlacetrinity / Product Type: *No Product type* /   PT Visit Information  PT Insurance Information: [facility] Medicare  Total # of Visits Approved: 14  Total # of Visits to Date: 10 (corrected count)  Plan of Care/Certification Expiration Date: 22  No Show: 0  Progress Note Due Date: 10/28/22  Canceled Appointment: 2  Progress Note Counter: 10/12 (22 to 22)    Subjective Information:  Subjective: Patient reports increased Rt LE pain this date, fluctuating today. Reports pain is more distal than usual, feels muscular with patient referring to quad pain. No injury reported.   HEP Compliance:  [x] Good [] Fair [] Poor [] Reports not doing due to:    Pain Screening  Patient Currently in Pain: Yes  Pain Level: 8  Pain Location: Leg  Pain Orientation: Right    Treatment:  Exercises:  Exercises  Exercise 2: Foam: static standing FT/FA 30sec, eyes closed up to 3'', head turns, supported SLS  Exercise 4: Gait drills: F/L/Marching/Retro x2- cues to improve posture  Exercise 7: Standing: Rt open chain: marching, hip abduction, hip circles, hip ext and HS curl x10 ea  Exercise 13: Supine hip flexor stretch R 1' x2  Exercise 16: Scifit level 1 for 6' to improve ROM and strength  Exercise 20: HEP: STM to Rt quad with TB       Manual:   Manual Therapy  Soft Tissue Mobilizaton: STM TB massage to Rt quad 8'         *Indicates exercise, modality, or manual techniques to be initiated when appropriate      Assessment: Body Structures, Functions, Activity Limitations Requiring Skilled Therapeutic Intervention: Decreased functional mobility , Decreased ROM, Decreased strength, Decreased endurance, Decreased balance, Decreased posture  Assessment: Patient presenting to PT with increased pain and Antalgic pattern requiring assistance from Daughter. Wheeled Walker utilized to improve stability and improve standing/ambulation tolerance. Patient reporting increased relief with TB massage to Rt Quad. Discussed calling MD if hip pain worsens, patient in W/C to depart clinic this date due to antalgic gait. Treatment Diagnosis: Impaired mobility, LE weakness  Therapy Prognosis: Good          Post-Pain Assessment:       Pain Rating (0-10 pain scale):   7/10 with ambulation, 0/10 at rest.    Location and pain description same as pre-treatment unless indicated. Action: [] NA   [] Perform HEP  [x] Meds as prescribed  [] Modalities as prescribed   [x] Call Physician     GOALS   Patient Goal(s):      Short Term Goals Completed by 3 weeks Goal Status   STG 1 Independent with HEP Partially met, In progress   STG 2 Pt will be able to complete sit to stand transfers without UE support S/I. Not Met, In progress     Long Term Goals Completed by 6 weeks Goal Status   LTG 1 Improve jake LE strength to >/= 4+/5 to improve stability with standing and walking. Not Met, In progress   LTG 2 Pt will ambulate without an AD >/= 200' on even and uneven surfaces with improved foot clearance and stride length S/I. Not Met, In progress   LTG 3 Masters >/= 45/56 to reduce pt's risk for falls. Not Met, In progress   LTG 4 5xSTS from chair with decreased reliance on UEs </= 15 sec to improve pt's functional strength.  Not Met,

## 2022-10-07 ENCOUNTER — HOSPITAL ENCOUNTER (OUTPATIENT)
Dept: PHYSICAL THERAPY | Age: 77
Setting detail: THERAPIES SERIES
Discharge: HOME | End: 2022-10-07
Payer: MEDICARE

## 2022-10-07 PROCEDURE — 97140 MANUAL THERAPY 1/> REGIONS: CPT

## 2022-10-07 PROCEDURE — 97110 THERAPEUTIC EXERCISES: CPT

## 2022-10-07 PROCEDURE — 97112 NEUROMUSCULAR REEDUCATION: CPT

## 2022-10-07 ASSESSMENT — PAIN SCALES - GENERAL: PAINLEVEL_OUTOF10: 0

## 2022-10-07 NOTE — PROGRESS NOTES
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]  Unspecified abnormalities of gait and mobility [R26.9]  Personal history of transient ischemic attack (TIA), and cerebral infarction without residual deficits [Z86.73]  Muscle wasting and atrophy, not elsewhere classified, unspecified thigh [M62.559]       Treatment Diagnosis: Impaired mobility, LE weakness    Visit Information:  Insurance: Payor: Delfino Franco / Plan: Naranjo Oven / Product Type: *No Product type* /   PT Visit Information  PT Insurance Information: BCBS Medicare  Total # of Visits Approved: 14  Total # of Visits to Date: 1 (corrected count)  Plan of Care/Certification Expiration Date: 22  No Show: 0  Progress Note Due Date: 10/28/22  Canceled Appointment: 2  Progress Note Counter:  (22 to 22)    Subjective Information:  Subjective: Patient reports if she walks straight ahead and not at an angle her right leg doesn't hurt as much.   HEP Compliance:  [x] Good [] Fair [] Poor [] Reports not doing due to:    Pain Screening  Patient Currently in Pain: No  Pain Assessment: 0-10  Pain Level: 0    Treatment:  Exercises:  Exercises  Exercise 2: foam: static standing FT/FA, EC/EO, weightshifting without UE support with SBA; mini squat on foam x 5 with UE support  Exercise 4: Gait drills: F/L/Marching/Retro x2- cues to improve posture  Exercise 6: SLR x 12, bilateral  Exercise 7: sink exercises x 15  Exercise 9: Supine figure 4 str 30s x 3 on Rt  Exercise 13: Supine hip flexor stretch R 1' x 3  Exercise 16: Scifit level 1 for 7' to improve ROM and strength  Exercise 20: HEP: continue with current       Manual:   Manual Therapy  Soft Tissue Mobilizaton: STM TB massage to Rt quad 8'       *Indicates exercise, modality, or manual techniques to be initiated when appropriate    Objective Measures:        Ambulation  Surface: Carpet  Device: No Device  Assistance: Supervision, Modified Independent  Quality of Gait: Decreased right LE stance time compared to left, forward flexed posture  Gait Deviations: Decreased step length, Decreased step height  Distance: 100 ft        Strength: [x] NT  [] MMT completed:           ROM: [x] NT  [] ROM measurements:               Assessment: Body Structures, Functions, Activity Limitations Requiring Skilled Therapeutic Intervention: Decreased functional mobility , Decreased ROM, Decreased strength, Decreased endurance, Decreased balance, Decreased posture  Assessment: Patient demonstrates fatigue with exercises this date but denies any increase in pain like she was having last week. Continued to work on strength and postural awareness for improved balance. Continue per POC. Treatment Diagnosis: Impaired mobility, LE weakness  Therapy Prognosis: Good          Post-Pain Assessment:       Pain Rating (0-10 pain scale):   0/10   Location and pain description same as pre-treatment unless indicated. Action: [] NA   [] Perform HEP  [] Meds as prescribed  [] Modalities as prescribed   [] Call Physician     GOALS   Patient Goal(s):      Short Term Goals Completed by 3 weeks Goal Status   STG 1 Independent with HEP Partially met, In progress   STG 2 Pt will be able to complete sit to stand transfers without UE support S/I. In progress     Long Term Goals Completed by 6 weeks Goal Status   LTG 1 Improve jake LE strength to >/= 4+/5 to improve stability with standing and walking. In progress   LTG 2 Pt will ambulate without an AD >/= 200' on even and uneven surfaces with improved foot clearance and stride length S/I. In progress   LTG 3 Masters >/= 45/56 to reduce pt's risk for falls.  In progress   LTG 4 5xSTS

## 2022-10-12 ENCOUNTER — HOSPITAL ENCOUNTER (OUTPATIENT)
Dept: PHYSICAL THERAPY | Age: 77
Setting detail: THERAPIES SERIES
Discharge: HOME | End: 2022-10-12
Payer: MEDICARE

## 2022-10-12 PROCEDURE — 97112 NEUROMUSCULAR REEDUCATION: CPT

## 2022-10-12 PROCEDURE — 97110 THERAPEUTIC EXERCISES: CPT

## 2022-10-12 NOTE — PROGRESS NOTES
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]  Unspecified abnormalities of gait and mobility [R26.9]  Personal history of transient ischemic attack (TIA), and cerebral infarction without residual deficits [Z86.73]  Muscle wasting and atrophy, not elsewhere classified, unspecified thigh [M62.559]       Treatment Diagnosis: Impaired mobility, LE weakness    Visit Information:  Insurance: Payor: Pay[employer] / Plan: Ley[employer]s / Product Type: *No Product type* /   PT Visit Information  PT Insurance Information: BCBS Medicare  Total # of Visits Approved: 14  Total # of Visits to Date: 12 (corrected count)  Plan of Care/Certification Expiration Date: 22  No Show: 0  Progress Note Due Date: 10/28/22  Canceled Appointment: 2  Progress Note Counter: - (22 to 22)    Subjective Information:  Subjective: Patient reports her hip acts up after Friday therapy session but aggravation calms down by the following week. Patient states her hip is not bothering her today. Patient states she has been doing HEP for balance and its been good.   HEP Compliance:  [x] Good [] Fair [] Poor [] Reports not doing due to:    Pain Screening  Patient Currently in Pain: No    Treatment:  Exercises:  Exercises  Exercise 4: Gait drills: F/L/Marchingx3 0-2UE ocass- cues to improve posture and increased step length  Exercise 6: SLR x 12, bilateral- verbal/tactile cues for quad engagement  Exercise 7: sink exercises x 15  Exercise 8: H/L Clamshells with RTB  2x12 jake  Exercise 9: Supine figure 4 str 30s x 3 on Rt  Exercise 10: multiple STS from chair performed this visit; emphasis on eccentric control and anterior lean; 1UE support needed to complete transition  Exercise 13: Supine hip flexor stretch R 1' x 3  Exercise 16: Scifit level 1.5 for 7' to improve ROM and strength  Exercise 20: HEP: continue with current     Assessment: Body Structures, Functions, Activity Limitations Requiring Skilled Therapeutic Intervention: Decreased functional mobility , Decreased ROM, Decreased strength, Decreased endurance, Decreased balance, Decreased posture  Assessment: Continued treatment this visit focusing increasing strength and decreasing pain in right hip to improve gait quality and stability. Patient required continuous verbal cueing for postural awareness and increasing step length to improve balance. Patient tolerated exercises without any increase in symptoms. Patient will continue to benefit from skilled physical therapy to progress towards goals per plan of care. Treatment Diagnosis: Impaired mobility, LE weakness  Therapy Prognosis: Good       Post-Pain Assessment:       Pain Rating (0-10 pain scale):  0 /10   Location and pain description same as pre-treatment unless indicated. Action: [] NA   [x] Perform HEP  [] Meds as prescribed  [] Modalities as prescribed   [] Call Physician     GOALS   Patient Goal(s):      Short Term Goals Completed by 3 weeks Goal Status   STG 1 Independent with HEP Partially met, In progress   STG 2 Pt will be able to complete sit to stand transfers without UE support S/I. In progress       Long Term Goals Completed by 6 weeks Goal Status   LTG 1 Improve jake LE strength to >/= 4+/5 to improve stability with standing and walking. In progress   LTG 2 Pt will ambulate without an AD >/= 200' on even and uneven surfaces with improved foot clearance and stride length S/I. In progress   LTG 3 Masters >/= 45/56 to reduce pt's risk for falls. In progress   LTG 4 5xSTS from chair with decreased reliance on UEs </= 15 sec to improve pt's functional strength.  In progress Plan:  Frequency/Duration:  Plan  Plan Frequency: 2  Plan weeks: 6  Current Treatment Recommendations: Strengthening, ROM, Balance training, Functional mobility training, Transfer training, Gait training, Stair training, Neuromuscular re-education, Manual Therapy - Soft Tissue Mobilization, Home exercise program, Safety education & training, Patient/Caregiver education & training, Equipment evaluation, education, & procurement, Modalities  Additional Comments: pt would like to continue with PT  Pt to continue current HEP. See objective section for any therapeutic exercise changes, additions or modifications this date.     Therapy Time:   PT Individual Minutes  Time In: 4106  Time Out: 0412  Minutes: 54  Timed Code Treatment Minutes: 54 Minutes    Procedure Minutes:0  Timed Activity Minutes Units   Ther Ex 30 2   Neuro  24 2     Electronically signed by Jessenia Allen PTA on 10/12/22 at 11:10 AM EDT

## 2022-10-12 NOTE — PROGRESS NOTES
[address]     Ph: [phone]  Fax: [phone]      [] Certification  [] Recertification []  Plan of Care  [x] Progress Note [] Discharge      Referring Provider: Nawaf Stone MD     From:  Monisha Crawford, PT, DPT   Patient: Rolando Hall (80 y.o. 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]  Unspecified abnormalities of gait and mobility [R26.9]  Personal history of transient ischemic attack (TIA), and cerebral infarction without residual deficits [Z86.73]  Muscle wasting and atrophy, not elsewhere classified, unspecified thigh [M62.559]       Treatment Diagnosis: Impaired mobility, LE weakness    Plan of Care/Certification Expiration Date: : 22   Progress Report Period from:  2022  to 10/12/2022    Visits to Date: 12 (corrected count) No Show: 0 Cancelled Appts: 2    OBJECTIVE:   Short Term Goals - Time Frame for Short Term Goals: 3 weeks    Goals Current/Discharge status  Status   Short Term Goal 1: Independent with HEP  Independent  Partially met, In progress   Short Term Goal 2: Pt will be able to complete sit to stand transfers without UE support S/I. Requires 1 UE to complete transfer  In progress     Long Term Goals - Time Frame for Long Term Goals : 6 weeks  Goals Current/ Discharge status Status   Long Term Goal 1: Improve jake LE strength to >/= 4+/5 to improve stability with standing and walking.  Strength LLE- as of   L Hip Flexion: 5/5  L Hip Extension: 2/5  L Hip ABduction: 4+/5  L Knee Flexion: 5/5  L Knee Extension: 5/5  L Ankle Dorsiflexion: 5/5  Strength RLE  R Hip Flexion: 5/5  R Hip Extension: 2+/5  R Hip ABduction: 4/5  R Knee Flexion: 5/5  R Knee Extension: 5/5  R Ankle Dorsiflexion: 5/5     In progress   Long Term Goal 2: Pt will ambulate without an AD >/= 200' on even and uneven surfaces with improved foot clearance and stride length S/I. Still requires verbal cueing for increased stride length In progress   Long Term Goal 3: Masters >/= 45/56 to reduce pt's risk for falls. 44/56 as of 9/28 In progress   Long Term Goal 4: 5xSTS from chair with decreased reliance on UEs </= 15 sec to improve pt's functional strength. Still requires increased reliance on UE for STS, requires >15 seconds  In progress       Body Structures, Functions, Activity Limitations Requiring Skilled Therapeutic Intervention: Decreased functional mobility , Decreased ROM, Decreased strength, Decreased endurance, Decreased balance, Decreased posture  Assessment: Continued treatment this visit focusing increasing strength and decreasing pain in right hip to improve gait quality and stability. Patient required continuous verbal cueing for postural awareness and increasing step length to improve balance. Patient continues to be challanged with STS without use of UE. Pt approaching Masters goal with improving balance score. Patient will continue to benefit from skilled physical therapy to progress towards goals per plan of care. Therapy Prognosis: Good       PLAN: [] Evaluate and Treat  Frequency/Duration:  Plan Frequency: 2 visits  Plan weeks: 2  Current Treatment Recommendations: Strengthening, ROM, Balance training, Functional mobility training, Transfer training, Gait training, Stair training, Neuromuscular re-education, Manual Therapy - Soft Tissue Mobilization, Home exercise program, Safety education & training, Patient/Caregiver education & training, Equipment evaluation, education, & procurement, Modalities  Additional Comments: 2 visits then will reassess      Patient Status:[x] Continue/ Initiate plan of Care    [] Discharge PT. Recommend pt continue with HEP.      [x] Additional visits requested, Please re-certify for additional visits:2    [] Hold Signature: Electronically signed by Farideh Osuna PTA on 10/12/22 at 12:34 PM EDT  Electronically signed by Fatoumata Fernandez PT on 10/26/2022 at 10:47 AM    If you have any questions or concerns, please don't hesitate to call. Thank you for your referral.    I have reviewed this plan of care and certify a need for medically necessary rehabilitation services.     Physician Signature:__________________________________________________________  Date:  Please sign and return

## 2022-10-14 ENCOUNTER — HOSPITAL ENCOUNTER (OUTPATIENT)
Dept: PHYSICAL THERAPY | Age: 77
Setting detail: THERAPIES SERIES
Discharge: HOME | End: 2022-10-14
Payer: MEDICARE

## 2022-10-14 PROCEDURE — 97112 NEUROMUSCULAR REEDUCATION: CPT

## 2022-10-14 PROCEDURE — 97110 THERAPEUTIC EXERCISES: CPT

## 2022-10-14 NOTE — PROGRESS NOTES
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]  Unspecified abnormalities of gait and mobility [R26.9]  Personal history of transient ischemic attack (TIA), and cerebral infarction without residual deficits [Z86.73]  Muscle wasting and atrophy, not elsewhere classified, unspecified thigh [M62.559]       Treatment Diagnosis: Impaired mobility, LE weakness    Visit Information:  Insurance: Payor: Kailey Pruett / Plan: [employer] / Product Type: *No Product type* /   PT Visit Information  PT Insurance Information: [facility] Medicare  Total # of Visits Approved: 14  Total # of Visits to Date: 15 (corrected count)  Plan of Care/Certification Expiration Date: 22  No Show: 0  Progress Note Due Date: 22  Canceled Appointment: 2  Progress Note Counter: 1/2 (22 to 22)    Subjective Information:  Subjective: Pt reports her hip with less pain after last session. Still having difficulty with sit to stand transfers at home. HEP Compliance:  [x] Good [] Fair [] Poor [] Reports not doing due to:    Pain Screening  Patient Currently in Pain: No    Treatment:  Exercises:  Exercises  Exercise 1: Rockerboard: A-P, semitandem  Exercise 4: Gait ladder F focusing on increased step length x3 laps  Exercise 10: STS from chair and mat with bean bags under toes, STS from mat with RTB post pull to encourage ant wt shift  Exercise 12: Supine Rt piriformis str 30s x2  Exercise 13: Supine hip flexor stretch R 2'  Exercise 14: Dual tasking: amb with cones on tray  Exercise 15: 6' box step ups x15 jake       Assessment:    Body Structures, Functions, Activity Limitations Requiring Skilled Therapeutic Intervention: Decreased functional mobility , Decreased ROM, Decreased strength, Decreased endurance, Decreased balance, Decreased posture  Assessment: Focused session on exercises to improve pt's ability to complete sit to stand transfers. Pt with decreased anterior weight shift throughout sit to stand transition likely contributing to her difficulty with completing transfers. Pt with limited ankle strength as seen with rockerboard. Pt with improving step length when ambulating throughout clinc with and without dual tasking. Treatment Diagnosis: Impaired mobility, LE weakness  Therapy Prognosis: Good     Activity Tolerance  Activity Tolerance: Patient tolerated treatment well    Post-Pain Assessment:       Pain Rating (0-10 pain scale): 0  /10   Location and pain description same as pre-treatment unless indicated. Action: [x] NA   [] Perform HEP  [] Meds as prescribed  [] Modalities as prescribed   [] Call Physician     GOALS     Short Term Goals Completed by 3 weeks Goal Status   STG 1 Independent with HEP Partially met, In progress   STG 2 Pt will be able to complete sit to stand transfers without UE support S/I. In progress     Long Term Goals Completed by 6 weeks Goal Status   LTG 1 Improve jake LE strength to >/= 4+/5 to improve stability with standing and walking. In progress   LTG 2 Pt will ambulate without an AD >/= 200' on even and uneven surfaces with improved foot clearance and stride length S/I. In progress   LTG 3 Masters >/= 45/56 to reduce pt's risk for falls. In progress   LTG 4 5xSTS from chair with decreased reliance on UEs </= 15 sec to improve pt's functional strength.  In progress            Plan:  Frequency/Duration:  Plan  Plan Frequency: 2  Plan weeks: 6  Current Treatment Recommendations: Strengthening, ROM, Balance training, Functional mobility training, Transfer training, Gait training, Stair training, Neuromuscular re-education, Manual Therapy - Soft Tissue Mobilization, Home exercise program, Safety education & training, Patient/Caregiver education & training, Equipment evaluation, education, & procurement, Modalities  Pt to continue current HEP. See objective section for any therapeutic exercise changes, additions or modifications this date.     Therapy Time:      PT Individual Minutes  Time In: 3045  Time Out: 1200  Minutes: 53  Timed Code Treatment Minutes: 53 Minutes  Procedure Minutes:0  Timed Activity Minutes Units   Ther Ex 20 2   Neuro varghese 33 2     Electronically signed by Belkys Bagley PT on 10/14/22 at 1:41 PM EDT

## 2022-10-19 ENCOUNTER — HOSPITAL ENCOUNTER (OUTPATIENT)
Dept: PHYSICAL THERAPY | Age: 77
Setting detail: THERAPIES SERIES
Discharge: HOME | End: 2022-10-19
Payer: MEDICARE

## 2022-10-19 NOTE — PROGRESS NOTES
Therapy                            Cancellation/No-show Note    Date: 10/19/2022  Patient: Nigel Reeves (4 y.o. 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]  Unspecified abnormalities of gait and mobility [R26.9]  Personal history of transient ischemic attack (TIA), and cerebral infarction without residual deficits [Z86.73]  Muscle wasting and atrophy, not elsewhere classified, unspecified thigh [M62.559]      Visit Information:  Insurance: Payor: [employer] / Plan: [employer] / Product Type: *No Product type* /   Visits to Date: 15 (corrected count)   No Show/Cancelled Appts: 0 / 3      For today's appointment patient:  [x]  Cancelled  []  Rescheduled appointment  []  No-show   []  Called pt to remind of next appointment     Reason given by patient:  [x]  Patient ill  []  Conflicting appointment  []  No transportation    []  Conflict with work  []  No reason given  []  Other:      [] Pt has future appointments scheduled, no follow up needed  [] Pt requests to be on hold. Reason:   If > 2 weeks please discuss with therapist.  [] Therapist to call pt for follow up     Comments:   Put chart in to be called file.     Signature: Electronically signed by Gilberto Kim PTA on 10/19/22 at 7:32 AM EDT

## 2022-10-26 ENCOUNTER — APPOINTMENT (OUTPATIENT)
Dept: PHYSICAL THERAPY | Age: 77
End: 2022-10-26
Payer: MEDICARE

## 2022-11-04 ENCOUNTER — HOSPITAL ENCOUNTER (OUTPATIENT)
Dept: PHYSICAL THERAPY | Age: 77
Setting detail: THERAPIES SERIES
Discharge: HOME | End: 2022-11-04
Payer: MEDICARE

## 2022-11-04 PROCEDURE — 97535 SELF CARE MNGMENT TRAINING: CPT

## 2022-11-04 PROCEDURE — 97112 NEUROMUSCULAR REEDUCATION: CPT

## 2022-11-04 NOTE — PROGRESS NOTES
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]  Unspecified abnormalities of gait and mobility [R26.9]  Personal history of transient ischemic attack (TIA), and cerebral infarction without residual deficits [Z86.73]  Muscle wasting and atrophy, not elsewhere classified, unspecified thigh [M62.559]       Treatment Diagnosis: Impaired mobility, LE weakness    Visit Information:  Insurance: Payor: Delfino Franco / Plan: Celeno / Product Type: *No Product type* /   PT Visit Information  PT Insurance Information: BCBS Medicare  Total # of Visits Approved: 14  Total # of Visits to Date: 15  Plan of Care/Certification Expiration Date: 22  No Show: 0  Progress Note Due Date: 22  Canceled Appointment: 3  Progress Note Counter: 2/2 (22 to 22)    Subjective Information:  Subjective: Pt states compliance with HEP 3-4x per week, still working on \"nose over toes\" when standing.  Daughter would like pt to continue with PT.  HEP Compliance:  [x] Good [] Fair [] Poor [] Reports not doing due to:    Pain Screening  Patient Currently in Pain: No    Treatment:  Exercises:  Exercises  Exercise 5: 4 square stepping over TB x3 each direction  Exercise 10: STS from chair and mat with bean bags under toes, stand to sit onto chair with 4\" box, stand to sit holding onto bar  Exercise 16: Ambulate on uneven blue mat with and without a cane  Exercise 20: HEP: Squats at sink with chair behind    *Indicates exercise, modality, or manual techniques to be initiated when appropriate    Objective Measures:      Transfers  Sit to Stand: Independent, Supervision (Pt. needs to use one arm to safely perform transfer from a chair, can perfom from the mat without the use of arms)  Stand to Sit: Independent, Supervision (pt uses one arm to transfer back into chair safely, can perform tranfer on mat without use of arms)    Zurita Balance Score: 42       Strength: [] NT  [x] MMT completed:  Strength RLE  R Hip Flexion: 5/5  R Hip Extension: 3-/5  R Hip ABduction: 4-/5  R Knee Flexion: 5/5  R Knee Extension: 5/5  R Ankle Dorsiflexion: 5/5  Strength LLE  L Hip Flexion: 5/5  L Hip Extension: 3-/5  L Hip ABduction: 4-/5  L Knee Flexion: 5/5  L Knee Extension: 5/5  L Ankle Dorsiflexion: 5/5       Assessment: Body Structures, Functions, Activity Limitations Requiring Skilled Therapeutic Intervention: Decreased functional mobility , Decreased ROM, Decreased strength, Decreased endurance, Decreased balance, Decreased posture  Assessment: Pt ZURITA score decreased by 2 points, difficulty with tandem standing as well as needing more time to complete a 360 degree turn. Initiated squatting at the parallel bars with a chair behind pt to imrpove eccentric control with STS. Initiated walking on uneven blue mat both with and without a cane to improve balance while walking on uneven surfaces. Initiated 4 square stepping to improve stepping strategies and balance. Pt would benefit from further physical therapy to continue improving balance, strength, and mobility. Treatment Diagnosis: Impaired mobility, LE weakness  Therapy Prognosis: Good     Activity Tolerance  Activity Tolerance: Patient tolerated treatment well    Post-Pain Assessment:       Pain Rating (0-10 pain scale):   0/10   Location and pain description same as pre-treatment unless indicated.    Action: [x] NA   [] Perform HEP  [] Meds as prescribed  [] Modalities as prescribed   [] Call Physician     GOALS   Patient Goal(s):      Short Term Goals Completed by 3 weeks Goal Status   STG 1 Independent with HEP Partially met, In progress   STG 2 Pt will be able to complete sit to stand transfers without UE support S/I. In progress       Long Term Goals Completed by 6 weeks Goal Status   LTG 1 Improve jake LE strength to >/= 4+/5 to improve stability with standing and walking. In progress   LTG 2 Pt will ambulate without an AD >/= 200' on even and uneven surfaces with improved foot clearance and stride length S/I. In progress   LTG 3 Masters >/= 45/56 to reduce pt's risk for falls. In progress   LTG 4 5xSTS from chair with decreased reliance on UEs </= 15 sec to improve pt's functional strength. In progress          Plan:  Frequency/Duration:  Plan  Plan Frequency: 2  Plan weeks: 6  Current Treatment Recommendations: Strengthening, ROM, Balance training, Functional mobility training, Transfer training, Gait training, Stair training, Neuromuscular re-education, Manual Therapy - Soft Tissue Mobilization, Home exercise program, Safety education & training, Patient/Caregiver education & training, Equipment evaluation, education, & procurement, Modalities  Pt to continue current HEP. See objective section for any therapeutic exercise changes, additions or modifications this date.     Therapy Time:      PT Individual Minutes  Time In: 7545  Time Out: 8976  Minutes: 54  Timed Code Treatment Minutes: 54 Minutes  Procedure Minutes: 0  Timed Activity Minutes Units   Transfers 10 1   Neuro 44 3     Electronically signed by Abelino Croft on 11/4/22 at 11:55 AM EDT  Treatment directly supervised by Electronically signed by Ashu Chavira PT on 11/4/22 at 4:35 PM EDT

## 2022-11-04 NOTE — PROGRESS NOTES
Lakesha bey [address]     Ph: [phone]  Fax: [phone]      [] Certification  [] Recertification []  Plan of Care  [x] Progress Note [] Discharge      Referring Provider: Farnaz Diamond MD     From:  Briseida Uribe  Patient: Alvaro Galarza (28 y.o. 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]  Unspecified abnormalities of gait and mobility [R26.9]  Personal history of transient ischemic attack (TIA), and cerebral infarction without residual deficits [Z86.73]  Muscle wasting and atrophy, not elsewhere classified, unspecified thigh [M62.559]       Treatment Diagnosis: Impaired mobility, LE weakness    Plan of Care/Certification Expiration Date: : 22   Progress Report Period from:  2022  to 2022    Visits to Date: 14 No Show: 0 Cancelled Appts: 3    OBJECTIVE:   Short Term Goals - Time Frame for Short Term Goals: 3 weeks    Goals Current/Discharge status  Status   Short Term Goal 1: Independent with HEP  Independent  Partially met, In progress   Short Term Goal 2: Pt will be able to complete sit to stand transfers without UE support S/I. Transfers  Sit to Stand: Independent, Supervision (Pt. needs to use one arm to safely perform transfer from a chair, can perfom from the mat without the use of arms)  Stand to Sit: Independent, Supervision (pt uses one arm to transfer back into chair safely, can perform tranfer on mat without use of arms)  In progress     Long Term Goals - Time Frame for Long Term Goals : 6 weeks  Goals Current/ Discharge status Status   Long Term Goal 1: Improve jake LE strength to >/= 4+/5 to improve stability with standing and walking.  Strength LLE  L Hip Flexion: 5/5  L Hip Extension: 3-/5  L Hip ABduction: 4-/5  L Knee Flexion: 5/5  L Knee Extension: 5/5  L Ankle Dorsiflexion: 5/5  Strength RLE  R Hip Flexion: 5/5  R Hip Extension: 3-/5  R Hip ABduction: 4-/5  R Knee Flexion: 5/5  R Knee Extension: 5/5  R Ankle Dorsiflexion: 5/5     In progress   Long Term Goal 2: Pt will ambulate without an AD >/= 200' on even and uneven surfaces with improved foot clearance and stride length S/I. Ambulation  Surface: Carpet  Device: No Device  Assistance: Supervision, Modified Independent  Quality of Gait: Decreased right LE stance time compared to left, forward flexed posture  Gait Deviations: Decreased step length, Decreased step height  Distance: 100 ft In progress   Long Term Goal 3: Zurita >/= 45/56 to reduce pt's risk for falls. Zurita Balance Score: 42  In progress   Long Term Goal 4: 5xSTS from chair with decreased reliance on UEs </= 15 sec to improve pt's functional strength. 5 Times Sit to Stand  5 TIMES SIT TO STAND: 28.37 seconds (from chair with 1 UE support)  In progress       Body Structures, Functions, Activity Limitations Requiring Skilled Therapeutic Intervention: Decreased functional mobility , Decreased ROM, Decreased strength, Decreased endurance, Decreased balance, Decreased posture  Assessment: Pt ZURITA score decreased by 2 points, difficulty with tandem standing as well as needing more time to complete a 360 degree turn. Initiated squatting at the parallel bars with a chair behind pt to imrpove eccentric control with STS. Initiated walking on uneven blue mat both with and without a cane to improve balance while walking on uneven surfaces. Initiated 4 square stepping to improve stepping strategies and balance. Pt would benefit from further physical therapy to continue improving balance, strength, and mobility.   Therapy Prognosis: Good    PLAN: [] Evaluate and Treat  Frequency/Duration:  Plan Frequency: 2  Plan weeks: 4  Current Treatment Recommendations: Strengthening, ROM, Balance training, Functional mobility training, Transfer training, Gait training, Stair training, Neuromuscular re-education, Manual Therapy - Soft Tissue Mobilization, Home exercise program, Safety education & training, Patient/Caregiver education & training, Equipment evaluation, education, & procurement, Modalities     Precautions:  falls                       Patient Status:[x] Continue/ Initiate plan of Care    [] Discharge PT. Recommend pt continue with HEP. [x] Additional visits requested, Please re-certify for additional visits: 8    [] Hold         Signature: Electronically signed by Tre Jones on 11/4/22 at 4:27 PM EDT  Electronically signed by Edith Saleh PT on 11/4/2022 at 4:37 PM      If you have any questions or concerns, please don't hesitate to call. Thank you for your referral.    I have reviewed this plan of care and certify a need for medically necessary rehabilitation services.     Physician Signature:__________________________________________________________  Date:  Please sign and return

## 2022-11-30 ENCOUNTER — HOSPITAL ENCOUNTER (OUTPATIENT)
Dept: PHYSICAL THERAPY | Age: 77
Setting detail: THERAPIES SERIES
End: 2022-11-30
Payer: MEDICARE

## 2022-11-30 NOTE — PROGRESS NOTES
[address]       Physical Therapy  Cancellation/No-show Note  Patient Name:  Sergei Velasco  :  1945   Date:  2022    Visit Information:  PT Visit Information  PT Insurance Information: [facility] Medicare  Total # of Visits Approved: 14  Total # of Visits to Date: 14  Plan of Care/Certification Expiration Date: 22  No Show: 0  Progress Note Due Date: 22  Canceled Appointment: 4  Progress Note Counter: 2/2 (22 to 22) *cxl     For today's appointment patient:  [x]  Cancelled  []  Rescheduled appointment  []  No-show   []  Called pt to remind of next appointment     Reason given by patient:  [x]  Patient ill  []  Conflicting appointment  []  No transportation    []  Conflict with work  []  No reason given  []  Other:      Signature: Electronically signed by Ramirez Reynoso PTA on 22 at 12:46 PM EST

## 2022-12-02 ENCOUNTER — HOSPITAL ENCOUNTER (OUTPATIENT)
Dept: PHYSICAL THERAPY | Age: 77
Setting detail: THERAPIES SERIES
Discharge: HOME | End: 2022-12-02

## 2022-12-02 NOTE — PROGRESS NOTES
[address]       Physical Therapy  Cancellation/No-show Note  Patient Name:  Sergei Velasco  :  1945   Date:  2022     Visit Information:  PT Visit Information  PT Insurance Information: [facility] Medicare  Total # of Visits Approved: 14  Total # of Visits to Date: 14  Plan of Care/Certification Expiration Date: 22  No Show: 0  Progress Note Due Date: 22  Canceled Appointment: 5  Progress Note Counter:  (22 to 22) *cxl     For today's appointment patient:  [x]  Cancelled  []  Rescheduled appointment  []  No-show   []  Called pt to remind of next appointment     Reason given by patient:  [x]  Patient ill  []  Conflicting appointment  []  No transportation    []  Conflict with work  []  No reason given  []  Other:      Signature: Electronically signed by Ramirez Reynoso PTA on 22 at 8:55 AM EST

## 2022-12-07 ENCOUNTER — HOSPITAL ENCOUNTER (OUTPATIENT)
Dept: PHYSICAL THERAPY | Age: 77
Setting detail: THERAPIES SERIES
End: 2022-12-07
Payer: MEDICARE

## 2022-12-07 NOTE — PROGRESS NOTES
Therapy                            Cancellation/No-show Note    Date: 2022  Patient: Leonora Barrios (38 y.o. 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]  Unspecified abnormalities of gait and mobility [R26.9]  Personal history of transient ischemic attack (TIA), and cerebral infarction without residual deficits [Z86.73]  Muscle wasting and atrophy, not elsewhere classified, unspecified thigh [M62.559]      Visit Information:  Insurance: Payor: Bernardo Frey / Plan: Marino Tesfaye / Product Type: *No Product type* /   Visits to Date: 14   No Show/Cancelled Appts: 0       For today's appointment patient:  [x]  Cancelled  []  Rescheduled appointment  []  No-show   []  Called pt to remind of next appointment     Reason given by patient:  [x]  Patient ill  []  Conflicting appointment  []  No transportation    []  Conflict with work  []  No reason given  []  Other:      [] Pt has future appointments scheduled, no follow up needed  [] Pt requests to be on hold. Reason:   If > 2 weeks please discuss with therapist.  [] Therapist to call pt for follow up     Comments:   Wants to continue.  Needs to extend date    Signature: Electronically signed by Kishor Oneill PTA on 22 at 8:54 AM EST

## 2022-12-15 ENCOUNTER — HOSPITAL ENCOUNTER (OUTPATIENT)
Dept: PHYSICAL THERAPY | Age: 77
Setting detail: THERAPIES SERIES
Discharge: HOME | End: 2022-12-15
Payer: MEDICARE

## 2022-12-15 PROCEDURE — 97110 THERAPEUTIC EXERCISES: CPT

## 2022-12-15 PROCEDURE — 97112 NEUROMUSCULAR REEDUCATION: CPT

## 2022-12-15 ASSESSMENT — PAIN DESCRIPTION - ORIENTATION: ORIENTATION: LOWER;MID

## 2022-12-15 ASSESSMENT — PAIN DESCRIPTION - PAIN TYPE: TYPE: ACUTE PAIN

## 2022-12-15 ASSESSMENT — PAIN DESCRIPTION - LOCATION: LOCATION: BACK

## 2022-12-15 ASSESSMENT — PAIN DESCRIPTION - DESCRIPTORS: DESCRIPTORS: ACHING

## 2022-12-15 ASSESSMENT — PAIN SCALES - GENERAL: PAINLEVEL_OUTOF10: 4

## 2022-12-15 NOTE — PROGRESS NOTES
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]  Unspecified abnormalities of gait and mobility [R26.9]  Personal history of transient ischemic attack (TIA), and cerebral infarction without residual deficits [Z86.73]  Muscle wasting and atrophy, not elsewhere classified, unspecified thigh [M62.559]       Treatment Diagnosis: Impaired mobility, LE weakness    Visit Information:  Insurance: Payor: Cassandra Pringle / Plan: Lashawn Foreman / Product Type: *No Product type* /   PT Visit Information  PT Insurance Information: BCBS Medicare  Total # of Visits Approved: 6  Total # of Visits to Date: 15  Plan of Care/Certification Expiration Date: 23  Canceled Appointment: 6  Progress Note Counter:  ( - )    Subjective Information:  Subjective: Pt reports she has been having mild LBP d/t falling out of bed two nights ago. Reports she did not go to ER or Dr. regarding fall. States \"I was pretty raza, I just fell on my butt. \"  HEP Compliance:  [] Good [x] Fair [] Poor [] Reports not doing due to:    Pain Screening  Patient Currently in Pain: Yes  Pain Assessment: 0-10  Pain Level: 4  Pain Type: Acute pain  Pain Location: Back  Pain Orientation: Lower, Mid  Pain Descriptors: Aching    Treatment:  Exercises:  Exercises  Exercise 1: objective measures / ZURITA  Exercise 2: foam: static standing FT/FA x30 seconds ea  Exercise 3: Single stepping over 6\" kari fwd/ lateral x2 laps ea, unilateral support  Exercise 4: gait drills fwd/retro/lateral x2 laps ea with emphasis on increasing step length  Exercise 7: sink exercises x 15  Exercise 10: STS from mat without UE support x5 in 25 seconds, with occ requiring UE support x2  Exercise 15: 6' box step ups x15 jake fwd/lateral  Exercise 16: Ambulate on uneven red mat with and without a cane      *Indicates exercise, modality, or manual techniques to be initiated when appropriate    Objective Measures:     Transfers  Sit to Stand: Independent, Supervision (occ UE support required d/t posterior LOB)  Stand to Sit: Independent    Ambulation  Surface: Carpet, Uneven  Device: No Device  Assistance: Supervision, Modified Independent  Quality of Gait: Decreased right LE stance time compared to left, forward flexed posture, occ shuffling  Gait Deviations: Decreased step length, Decreased step height  Distance: 200'    Strength: [] NT  [x] MMT completed:  Strength RLE  R Hip Flexion: 5/5  R Hip Extension: 4-/5  R Hip ABduction: 4-/5  R Knee Flexion: 5/5  R Knee Extension: 5/5  R Ankle Dorsiflexion: 4+/5  Strength LLE  L Hip Flexion: 5/5  L Hip Extension: 4-/5  L Hip ABduction: 4-/5  L Knee Flexion: 5/5  L Knee Extension: 5/5  L Ankle Dorsiflexion: 4+/5      ROM: [x] NT  [] ROM measurements:    Assessment: Body Structures, Functions, Activity Limitations Requiring Skilled Therapeutic Intervention: Decreased functional mobility , Decreased ROM, Decreased strength, Decreased endurance, Decreased balance, Decreased posture  Assessment: Pt demonstrates good improvements this session secondary to improving ZURITA score to 45/56. Pt also able to perform 200' gait training this session without AD and no LOB but does continue to exhibit decreased stride length as well as occ decreased step clearance. VC's provided for improving step length along with visual cues, fair follow through demonstrated. Occ during STS, pt displays posterior LOB requiring increased trials to complete STS without UE support. VC's provided throughout for proper technique to improve quality and safety of trsfs. Recommend pt continue on with current HEP.   Treatment Diagnosis: Impaired mobility, LE weakness  Therapy Prognosis: Good     Post-Pain Assessment:       Pain Rating (0-10 pain scale):  4 /10   Location and pain description same as pre-treatment unless indicated. Action: [] NA   [x] Perform HEP  [] Meds as prescribed  [x] Modalities as prescribed   [] Call Physician     GOALS   Patient Goal(s):      Short Term Goals Completed by 3 weeks Goal Status   STG 1 Independent with HEP Partially met, In progress   STG 2 Pt will be able to complete sit to stand transfers without UE support S/I. In progress     Long Term Goals Completed by 6 weeks Goal Status   LTG 1 Improve jake LE strength to >/= 4+/5 to improve stability with standing and walking. In progress   LTG 2 Pt will ambulate without an AD >/= 200' on even and uneven surfaces with improved foot clearance and stride length S/I. In progress   LTG 3 Masters >/= 45/56 to reduce pt's risk for falls. Met   LTG 4 5xSTS from chair with decreased reliance on UEs </= 15 sec to improve pt's functional strength. In progress       Plan:  Frequency/Duration:  Plan  Plan Frequency: 2  Plan weeks: 4  Current Treatment Recommendations: Strengthening, ROM, Balance training, Functional mobility training, Transfer training, Stair training, Neuromuscular re-education, Manual, Home exercise program, Safety education & training, Patient/Caregiver education & training, Equipment evaluation, education, & procurement, Modalities  Pt to continue current HEP. See objective section for any therapeutic exercise changes, additions or modifications this date.     Therapy Time:      PT Individual Minutes  Time In: 4245  Time Out: 6729  Minutes: 54  Timed Code Treatment Minutes: 54 Minutes  Procedure Minutes:0  Timed Activity Minutes Units   Ther Ex 25 2   NMR  29 2     Electronically signed by Silas Hardy PTA on 12/15/22 at 11:09 AM EST

## 2022-12-15 NOTE — PROGRESS NOTES
Nadeem bey, [address]     Ph: [phone]  Fax: [phone]      [] Certification  [] Recertification []  Plan of Care  [x] Progress Note [] Discharge      Referring Provider: Liz HillModesta MD Vladimir     From:  Taniya Lopez, PT  Patient: Avelina Allan (87 y.o. 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]  Unspecified abnormalities of gait and mobility [R26.9]  Personal history of transient ischemic attack (TIA), and cerebral infarction without residual deficits [Z86.73]  Muscle wasting and atrophy, not elsewhere classified, unspecified thigh [M62.559]       Treatment Diagnosis: Impaired mobility, LE weakness    Plan of Care/Certification Expiration Date: : 23   Progress Report Period from:  2022  to 12/15/2022    Visits to Date: 15 No Show:   Cancelled Appts: 6    OBJECTIVE:   Short Term Goals - Time Frame for Short Term Goals: 3 weeks    Goals Current/Discharge status  Status   Short Term Goal 1: Independent with HEP  Cont. To progress Partially met, In progress   Short Term Goal 2: Pt will be able to complete sit to stand transfers without UE support S/I. Occ UE support still required In progress     Long Term Goals - Time Frame for Long Term Goals : 6 weeks  Goals Current/ Discharge status Status   Long Term Goal 1: Improve jake LE strength to >/= 4+/5 to improve stability with standing and walking.  Strength LLE  L Hip Flexion: 5/5  L Hip Extension: 4-/5  L Hip ABduction: 4-/5  L Knee Flexion: 5/5  L Knee Extension: 5/5  L Ankle Dorsiflexion: 4+/5  Strength RLE  R Hip Flexion: 5/5  R Hip Extension: 4-/5  R Hip ABduction: 4-/5  R Knee Flexion: 5/5  R Knee Extension: 5/5  R Ankle Dorsiflexion: 4+/5    In progress   Long Term Goal 2: Pt will ambulate without an AD >/= 200' on even and uneven surfaces with improved foot clearance and stride length S/I. Able to perform , continues to demonstrate decreased foot clearance occ, decreased stride length throughout In progress   Long Term Goal 3: Zurita >/= 45/56 to reduce pt's risk for falls. 12/15/22: 45/56 Met   Long Term Goal 4: 5xSTS from chair with decreased reliance on UEs </= 15 sec to improve pt's functional strength. 25 seconds with requiring UE support x2 In progress     Body Structures, Functions, Activity Limitations Requiring Skilled Therapeutic Intervention: Decreased functional mobility , Decreased ROM, Decreased strength, Decreased endurance, Decreased balance, Decreased posture  Assessment: Pt demonstrates good improvements this session secondary to improving ZURITA score to 45/56. Pt also able to perform 200' gait training this session without AD and no LOB but does continue to exhibit decreased stride length as well as occ decreased step clearance. VC's provided for improving step length along with visual cues, fair follow through demonstrated. Occ during STS, pt displays posterior LOB requiring increased trials to complete STS without UE support. VC's provided throughout for proper technique to improve quality and safety of trsfs. Recommend pt continue on with current HEP. Therapy Prognosis: Good    PLAN: [] Evaluate and Treat  Frequency/Duration:  Plan Frequency: 2  Plan weeks: 4  Current Treatment Recommendations: Strengthening, ROM, Balance training, Functional mobility training, Transfer training, Stair training, Neuromuscular re-education, Manual, Home exercise program, Safety education & training, Patient/Caregiver education & training, Equipment evaluation, education, & procurement, Modalities     Precautions:  N/A                          Patient Status:[x] Continue/ Initiate plan of Care    [] Discharge PT. Recommend pt continue with HEP.      [] Additional visits requested, Please re-certify for additional visits:    [] Hold         Signature: Objective Measures Taken By: Electronically signed by Pricilla Gambino PTA on 12/15/22 at 12:19 PM EST  Electronically signed by Skip Gtz PT on 12/21/2022 at 9:17 AM      If you have any questions or concerns, please don't hesitate to call. Thank you for your referral.    I have reviewed this plan of care and certify a need for medically necessary rehabilitation services.     Physician Signature:__________________________________________________________  Date:  Please sign and return

## 2022-12-21 ENCOUNTER — HOSPITAL ENCOUNTER (OUTPATIENT)
Dept: PHYSICAL THERAPY | Age: 77
Setting detail: THERAPIES SERIES
Discharge: HOME | End: 2022-12-21
Payer: MEDICARE

## 2022-12-21 PROCEDURE — 97112 NEUROMUSCULAR REEDUCATION: CPT

## 2022-12-21 PROCEDURE — 97110 THERAPEUTIC EXERCISES: CPT

## 2022-12-21 NOTE — PROGRESS NOTES
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]  Unspecified abnormalities of gait and mobility [R26.9]  Personal history of transient ischemic attack (TIA), and cerebral infarction without residual deficits [Z86.73]  Muscle wasting and atrophy, not elsewhere classified, unspecified thigh [M62.559]       Treatment Diagnosis: Impaired mobility, LE weakness    Visit Information:  Insurance: Payor: Doris Quinones / Plan: Aditya Infante / Product Type: *No Product type* /   PT Visit Information  PT Insurance Information: BCBS Medicare  Total # of Visits Approved: 6  Total # of Visits to Date: 12  Plan of Care/Certification Expiration Date: 23  No Show: 0  Progress Note Due Date: 23  Canceled Appointment: 6  Progress Note Counter:  ( - )    Subjective Information:  Subjective: Pt reports no falls since last time she was her. Pt reports her hip is feeling better but experiencing LBP  recenty.   HEP Compliance:  [x] Good [] Fair [] Poor [] Reports not doing due to:    Pain Screening  Patient Currently in Pain: No    Treatment:  Exercises:  Exercises  Exercise 4: gait drills: Frwd/marching with hold, tandem x2 laps- emphasis on increasing step length  Exercise 10: STS from chair- attempting with no UE support, requires one UE to complete, able to control descent without UE  Exercise 15: 6' box step ups x15 jake fwd/lateral  Exercise 16: Ambulate on uneven red mat x3 laps with CGA from therapist  Exercise 20: HEP: Squats at sink with chair behind    Objective Measures:   Ambulation  Surface: Carpet  Device: No Device  Assistance: Stand by assistance  Quality of Gait: Decreased right LE stance time compared to left, forward flexed posture, occ shuffling  Gait Deviations: Decreased step length, Decreased step height  Distance: 200'    Assessment: Body Structures, Functions, Activity Limitations Requiring Skilled Therapeutic Intervention: Decreased functional mobility , Decreased ROM, Decreased strength, Decreased endurance, Decreased balance, Decreased posture  Assessment: Conintued treatment focusing on increasing strength and endurance for more stable gait. Pt continues to demonstrate difficulty completing STS from chair without use of UE. Pt able to complete transition with use of 1 UE and increase time to complete. Pt demonstrated good eccentric control with cues for proper technique. Pt continues to demonstrate frwd flexed gait and requires cues for upright posture and increased step length for improve gait pattern. Pt required seated rest breaks dt fatigue. Patient will continue to benefit from skilled physical therapy to continue progressing towards goals per plan of care. Treatment Diagnosis: Impaired mobility, LE weakness  Therapy Prognosis: Good     Post-Pain Assessment:       Pain Rating (0-10 pain scale):   0/10   Location and pain description same as pre-treatment unless indicated. Action: [] NA   [x] Perform HEP  [] Meds as prescribed  [] Modalities as prescribed   [] Call Physician     GOALS   Patient Goal(s):      Short Term Goals Completed by 3 weeks Goal Status   STG 1 Independent with HEP Partially met, In progress   STG 2 Pt will be able to complete sit to stand transfers without UE support S/I. In progress     Long Term Goals Completed by 6 weeks Goal Status   LTG 1 Improve jake LE strength to >/= 4+/5 to improve stability with standing and walking. In progress   LTG 2 Pt will ambulate without an AD >/= 200' on even and uneven surfaces with improved foot clearance and stride length S/I. In progress   LTG 3 Masters >/= 45/56 to reduce pt's risk for falls.  Met   LTG 4 5xSTS from chair with decreased reliance on UEs </= 15 sec to improve pt's functional strength. In progress          Plan:  Frequency/Duration:  Plan  Plan Frequency: 2  Plan weeks: 4  Current Treatment Recommendations: Strengthening, ROM, Balance training, Functional mobility training, Transfer training, Stair training, Neuromuscular re-education, Manual, Home exercise program, Safety education & training, Patient/Caregiver education & training, Equipment evaluation, education, & procurement, Modalities  Pt to continue current HEP. See objective section for any therapeutic exercise changes, additions or modifications this date.     Therapy Time:   PT Individual Minutes  Time In: 1310  Time Out: 1400  Minutes: 50  Timed Code Treatment Minutes: 50 Minutes    Procedure Minutes:0  Timed Activity Minutes Units   Neuro  20 1   Therex 30 2     Electronically signed by Ramirez Reynoso PTA on 12/21/22 at 3:51 PM EST

## 2022-12-29 ENCOUNTER — HOSPITAL ENCOUNTER (OUTPATIENT)
Dept: PHYSICAL THERAPY | Age: 77
Setting detail: THERAPIES SERIES
Discharge: HOME | End: 2022-12-29
Payer: MEDICARE

## 2022-12-29 PROCEDURE — 97112 NEUROMUSCULAR REEDUCATION: CPT

## 2022-12-29 PROCEDURE — 97535 SELF CARE MNGMENT TRAINING: CPT

## 2022-12-29 ASSESSMENT — PAIN DESCRIPTION - ORIENTATION: ORIENTATION: RIGHT

## 2022-12-29 ASSESSMENT — PAIN DESCRIPTION - DESCRIPTORS: DESCRIPTORS: SHARP;PENETRATING

## 2022-12-29 ASSESSMENT — PAIN DESCRIPTION - LOCATION: LOCATION: SHOULDER

## 2022-12-29 ASSESSMENT — PAIN SCALES - GENERAL: PAINLEVEL_OUTOF10: 6

## 2022-12-29 NOTE — PROGRESS NOTES
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]  Unspecified abnormalities of gait and mobility [R26.9]  Personal history of transient ischemic attack (TIA), and cerebral infarction without residual deficits [Z86.73]  Muscle wasting and atrophy, not elsewhere classified, unspecified thigh [M62.559]       Treatment Diagnosis: Impaired mobility, LE weakness    Visit Information:  Insurance: Payor: Cassandra Pringle / Plan: Lashawn Foreman / Product Type: *No Product type* /   PT Visit Information  PT Insurance Information: BCBS Medicare  Total # of Visits Approved: 6  Total # of Visits to Date: 16  Plan of Care/Certification Expiration Date: 23  No Show: 0  Progress Note Due Date: 23  Canceled Appointment: 6  Progress Note Counter: 3/6 ( - )    Subjective Information:  Subjective: Pt feels she is maiking progress  HEP Compliance:  [] Good [x] Fair [] Poor [] Reports not doing due to:  Discussed compliance importance  Pain Screening  Patient Currently in Pain: Yes  Pain Assessment: 0-10  Pain Level: 6  Pain Location: Shoulder  Pain Orientation: Right  Pain Descriptors: Sharp, Penetrating    Treatment:  Exercises:  Exercises  Exercise 1: LOS A/P with occ perturbances, stepping startegies  Exercise 2: foam: static standing FT 8-37secs w/o support, x 60s x 1/FA x60 seconds ea, stepping on/off w/o ue supprt x 1 post LOB( cga)  Exercise 3: Single stepping over 6\" kari fwd/ lateral x2 laps ea,0- unilateral support  Exercise 4: gait drills: Frwd/marching with hold, tandem x2 laps- emphasis on increasing step length, F/R  Exercise 6: perturances with decreased righting reactions  Exercise 10: STS from chair- attempting with no UE support, requires one UE to complete     *Indicates exercise, modality, or manual techniques to be initiated when appropriate   Assessment: Body Structures, Functions, Activity Limitations Requiring Skilled Therapeutic Intervention: Decreased functional mobility , Decreased ROM, Decreased strength, Decreased endurance, Decreased balance, Decreased posture  Assessment: Inititaed  LOS as pt demo's posterior LOB on foam with no righting reactions. Pt demo's difficulty correcting posterior LOB and requires several VCs and visual cues for stepping/ hip strategies with poor carryover. pt would beneit from continued practice with STS from chair w/o ue to focus on technique. Treatment Diagnosis: Impaired mobility, LE weakness  Therapy Prognosis: Good          Post-Pain Assessment:       Pain Rating (0-10 pain scale):   0/10   Location and pain description same as pre-treatment unless indicated. Action: [x] NA   [] Perform HEP  [] Meds as prescribed  [] Modalities as prescribed   [] Call Physician     GOALS   Patient Goal(s):      Short Term Goals Completed by 3 weeks Goal Status   STG 1 Independent with HEP Partially met, In progress   STG 2 Pt will be able to complete sit to stand transfers without UE support S/I. In progress, Not Met       Long Term Goals Completed by 6 weeks Goal Status   LTG 1 Improve jake LE strength to >/= 4+/5 to improve stability with standing and walking. In progress   LTG 2 Pt will ambulate without an AD >/= 200' on even and uneven surfaces with improved foot clearance and stride length S/I. In progress   LTG 3 Masters >/= 45/56 to reduce pt's risk for falls. Met   LTG 4 5xSTS from chair with decreased reliance on UEs </= 15 sec to improve pt's functional strength.  In progress, Not Met       Plan:  Frequency/Duration:  Plan  Plan Frequency: 2  Plan weeks: 4  Current Treatment Recommendations: Strengthening, ROM, Balance training, Functional mobility training, Transfer training, Stair training, Neuromuscular re-education, Manual, Home exercise program, Safety education & training, Patient/Caregiver education & training, Equipment evaluation, education, & procurement, Modalities  Pt to continue current HEP. See objective section for any therapeutic exercise changes, additions or modifications this date.     Therapy Time:      PT Individual Minutes  Time In: 5023  Time Out: 2990  Minutes: 53  Timed Code Treatment Minutes: 53 Minutes  Procedure Minutes:0  Timed Activity Minutes Units   Transfers 23 2   Neuro 30 2     Electronically signed by Shonna Grissom PTA on 12/29/22 at 4:25 PM EST

## 2023-01-04 ENCOUNTER — HOSPITAL ENCOUNTER (OUTPATIENT)
Dept: PHYSICAL THERAPY | Age: 78
Setting detail: THERAPIES SERIES
Discharge: HOME | End: 2023-01-04

## 2023-01-04 NOTE — PROGRESS NOTES
[address]       Physical Therapy  Cancellation/No-show Note  Patient Name:  Daim Marrero  :  1945   Date:  2023     Visit Information:  PT Visit Information  PT Insurance Information: [facility] Medicare  Total # of Visits Approved: 6  Total # of Visits to Date: 17  Plan of Care/Certification Expiration Date: 23  No Show: 0  Progress Note Due Date: 23  Canceled Appointment: 7  Progress Note Counter: 3/6 ( - ) *cxl     For today's appointment patient:  [x]  Cancelled  []  Rescheduled appointment  []  No-show   []  Called pt to remind of next appointment     Reason given by patient:  [x]  Patient ill- hurt toe   []  Conflicting appointment  []  No transportation    []  Conflict with work  []  No reason given  []  Other:      Signature: Electronically signed by Jammie Serra PTA on 23 at 10:48 AM EST

## 2023-01-05 NOTE — PROGRESS NOTES
Therapy                            Cancellation/No-show Note    Date: 2023  Patient: Chavo Mae (12 y.o. 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]  Unspecified abnormalities of gait and mobility [R26.9]  Personal history of transient ischemic attack (TIA), and cerebral infarction without residual deficits [Z86.73]  Muscle wasting and atrophy, not elsewhere classified, unspecified thigh [M62.559]      Visit Information:  Insurance: Payor: Alonzo Aragon / Plan: [employer] / Product Type: *No Product type* /   Visits to Date:    No Show/Cancelled Appts: 0       For today's appointment patient:  []  Cancelled  []  Rescheduled appointment  []  No-show   []  Called pt to remind of next appointment     Reason given by patient:  []  Patient ill  []  Conflicting appointment  []  No transportation    []  Conflict with work  []  No reason given  [x]  Other:  Pt cancelled next 2 visits d/t a broken toe. Pt wants to see MD before returning. Pt has no further appt's on schedule. [] Pt has future appointments scheduled, no follow up needed  [] Pt requests to be on hold.     Reason:   If > 2 weeks please discuss with therapist.  [x] Therapist to call pt for follow up     Comments:       Signature: Electronically signed by Loreto Tobin PTA on 23 at 9:41 AM EST

## 2023-01-06 ENCOUNTER — HOSPITAL ENCOUNTER (OUTPATIENT)
Dept: PHYSICAL THERAPY | Age: 78
Setting detail: THERAPIES SERIES
Discharge: HOME | End: 2023-01-06

## 2023-05-02 LAB
ALANINE AMINOTRANSFERASE (SGPT) (U/L) IN SER/PLAS: 22 U/L (ref 7–45)
ALBUMIN (G/DL) IN SER/PLAS: 4.3 G/DL (ref 3.4–5)
ALBUMIN (MG/L) IN URINE: NORMAL
ALBUMIN/CREATININE (UG/MG) IN URINE: NORMAL
ALKALINE PHOSPHATASE (U/L) IN SER/PLAS: 128 U/L (ref 33–136)
ANION GAP IN SER/PLAS: 15 MMOL/L (ref 10–20)
ASPARTATE AMINOTRANSFERASE (SGOT) (U/L) IN SER/PLAS: 30 U/L (ref 9–39)
BILIRUBIN TOTAL (MG/DL) IN SER/PLAS: 0.5 MG/DL (ref 0–1.2)
CALCIUM (MG/DL) IN SER/PLAS: 9.6 MG/DL (ref 8.6–10.3)
CARBON DIOXIDE, TOTAL (MMOL/L) IN SER/PLAS: 28 MMOL/L (ref 21–32)
CHLORIDE (MMOL/L) IN SER/PLAS: 104 MMOL/L (ref 98–107)
CHOLESTEROL (MG/DL) IN SER/PLAS: 165 MG/DL (ref 0–199)
CHOLESTEROL IN HDL (MG/DL) IN SER/PLAS: 91 MG/DL
CHOLESTEROL/HDL RATIO: 1.8
CREATININE (MG/DL) IN SER/PLAS: 1.21 MG/DL (ref 0.5–1.05)
CREATININE (MG/DL) IN URINE: NORMAL
ESTIMATED AVERAGE GLUCOSE FOR HBA1C: 157 MG/DL
GFR FEMALE: 46 ML/MIN/1.73M2
GLUCOSE (MG/DL) IN SER/PLAS: 90 MG/DL (ref 74–99)
HEMOGLOBIN A1C/HEMOGLOBIN TOTAL IN BLOOD: 7.1 %
LDL: 61 MG/DL (ref 0–99)
POTASSIUM (MMOL/L) IN SER/PLAS: 4.6 MMOL/L (ref 3.5–5.3)
PROTEIN TOTAL: 6.9 G/DL (ref 6.4–8.2)
SODIUM (MMOL/L) IN SER/PLAS: 142 MMOL/L (ref 136–145)
THYROTROPIN (MIU/L) IN SER/PLAS BY DETECTION LIMIT <= 0.05 MIU/L: 3.65 MIU/L (ref 0.44–3.98)
TRIGLYCERIDE (MG/DL) IN SER/PLAS: 66 MG/DL (ref 0–149)
UREA NITROGEN (MG/DL) IN SER/PLAS: 36 MG/DL (ref 6–23)
VLDL: 13 MG/DL (ref 0–40)